# Patient Record
Sex: FEMALE | Race: BLACK OR AFRICAN AMERICAN | Employment: OTHER | URBAN - METROPOLITAN AREA
[De-identification: names, ages, dates, MRNs, and addresses within clinical notes are randomized per-mention and may not be internally consistent; named-entity substitution may affect disease eponyms.]

---

## 2017-01-22 DIAGNOSIS — I10 BENIGN HYPERTENSION: ICD-10-CM

## 2017-01-23 RX ORDER — RAMIPRIL 10 MG/1
CAPSULE ORAL
Qty: 180 CAP | Refills: 1 | Status: SHIPPED | OUTPATIENT
Start: 2017-01-23 | End: 2017-07-18 | Stop reason: SDUPTHER

## 2017-03-27 ENCOUNTER — OFFICE VISIT (OUTPATIENT)
Dept: INTERNAL MEDICINE CLINIC | Age: 62
End: 2017-03-27

## 2017-03-27 VITALS
WEIGHT: 191 LBS | HEIGHT: 60 IN | SYSTOLIC BLOOD PRESSURE: 127 MMHG | BODY MASS INDEX: 37.5 KG/M2 | HEART RATE: 68 BPM | OXYGEN SATURATION: 98 % | TEMPERATURE: 98.5 F | DIASTOLIC BLOOD PRESSURE: 67 MMHG | RESPIRATION RATE: 16 BRPM

## 2017-03-27 DIAGNOSIS — J30.1 SEASONAL ALLERGIC RHINITIS DUE TO POLLEN: Primary | ICD-10-CM

## 2017-03-27 DIAGNOSIS — E11.59 TYPE 2 DIABETES MELLITUS WITH OTHER CIRCULATORY COMPLICATION: ICD-10-CM

## 2017-03-27 RX ORDER — FLUTICASONE PROPIONATE 50 MCG
2 SPRAY, SUSPENSION (ML) NASAL DAILY
Qty: 1 BOTTLE | Refills: 6 | Status: SHIPPED | OUTPATIENT
Start: 2017-03-27 | End: 2017-05-08

## 2017-03-27 NOTE — MR AVS SNAPSHOT
Visit Information Date & Time Provider Department Dept. Phone Encounter #  
 3/27/2017  3:00 PM Andria Kelly MD [facility] [phone] [id number] Your Appointments 7/27/2017  9:00 AM  
ROUTINE CARE with Andria Kelly MD  
[facility]) Appt Note: Follow up visit [place] [address] [place] [id number]  
[address] [address]  
  
    
 10/16/2017  2:20 PM  
ESTABLISHED PATIENT with Anastasia Holliday MD  
[facility] ([facility] SCH[facility]UL[facility]) Appt Note: annual  
 [address] [facility] [place] [address]  
54 [facility] [id number] [place] [address] Upcoming Health Maintenance Date Due DTaP/Tdap/Td series (1 - Tdap) 9/17/1974 PAP AKA CERVICAL CYTOLOGY 9/17/1974 ZOSTER VACCINE AGE 60> 9/17/2013 FOOT EXAM Q1 7/24/2016 INFLUENZA AGE 9 TO ADULT 8/1/2016 COLONOSCOPY 10/26/2016 HEMOGLOBIN A1C Q6M 5/23/2017 MICROALBUMIN Q1 7/25/2017 LIPID PANEL Q1 7/25/2017 BREAST CANCER SCRN MAMMOGRAM 8/26/2017 EYE EXAM RETINAL OR DILATED Q1 10/24/2017 Allergies as of 3/27/2017  Review Complete On: 3/27/2017 By: Svetlana Aleman LPN Severity Noted Reaction Type Reactions Amoxicillin  12/02/2010    Unknown (comments) Current Immunizations  Reviewed on 11/23/2016 Name Date Pneumococcal Polysaccharide (PPSV-23) 10/27/2015 Not reviewed this visit You Were Diagnosed With   
  
 Codes Comments Seasonal allergic rhinitis due to pollen    -  Primary ICD-10-CM: J30.1 ICD-9-CM: 477.0 Type 2 diabetes mellitus with other circulatory complication (HCC)     JKW-35-XS: E11.59 Vitals BP Pulse Temp Resp Height(growth percentile) Weight(growth percentile) 127/67 68 98.5 °F (36.9 °C) (Oral) 16 5' (1.524 m) 191 lb (86.6 kg) SpO2 BMI OB Status Smoking Status 98% 37.3 kg/m2 Postmenopausal Former Smoker Vitals History BMI and BSA Data Body Mass Index Body Surface Area  
 37.3 kg/m 2 1.91 m 2 Preferred Pharmacy Pharmacy Name Phone [id number] [place], [address] [phone] Your Updated Medication List  
  
   
This list is accurate as of: 3/27/17  3:40 PM.  Always use your most recent med list.  
  
  
  
  
 aspirin 325 mg tablet Generic drug:  aspirin Take 325 mg by mouth daily. atorvastatin 40 mg tablet Commonly known as:  LIPITOR Take 1 Tab by mouth every evening. fluticasone 50 mcg/actuation nasal spray Commonly known as:  Alric Eaves 2 Sprays by Both Nostrils route daily. glimepiride 1 mg tablet Commonly known as:  AMARYL  
TAKE 1 TABLET BY MOUTH EVERY DAY BEFORE BREAKFAST  
  
 glucose blood VI test strips strip Commonly known as:  CONTOUR NEXT STRIPS Use one test strip daily  
  
 metFORMIN 1,000 mg tablet Commonly known as:  GLUCOPHAGE  
TAKE 1 TAB BY MOUTH TWO (2) TIMES DAILY (WITH MEALS). NITROLINGUAL 400 mcg/spray spray Generic drug:  nitroglycerin 1-2 Sprays at onset of attack onto or under tongue no more than 3 sprays in 15 minutes  
  
 ramipril 10 mg capsule Commonly known as:  ALTACE  
TAKE 2 CAPSULES BY MOUTH EVERY MORNING  
  
 * TOPROL XL 25 mg XL tablet Generic drug:  metoprolol succinate Take 25 mg by mouth daily. * metoprolol succinate 25 mg XL tablet Commonly known as:  TOPROL-XL  
TAKE 1 TABLET BY MOUTH EVERY DAY  
  
 * Notice: This list has 2 medication(s) that are the same as other medications prescribed for you. Read the directions carefully, and ask your doctor or other care provider to review them with you. Prescriptions Printed Refills fluticasone (FLONASE) 50 mcg/actuation nasal spray 6 Si Sprays by Both Nostrils route daily. Class: Print Route: Both Nostrils We Performed the Following HEMOGLOBIN A1C W/O EAG [91894 CPT(R)] METABOLIC PANEL, COMPREHENSIVE [37005 CPT(R)] Introducing [facility] & [facility]! Jenniflory Stacey introduces [employer] patient portal. Now you can access parts of your medical record, email your doctor's office, and request medication refills online. 1. In your internet browser, go to [email or web address] 2. Click on the First Time User? Click Here link in the Sign In box. You will see the New Member Sign Up page. 3. Enter your [employer] Access Code exactly as it appears below. You will not need to use this code after youve completed the sign-up process. If you do not sign up before the expiration date, you must request a new code. · [employer] Access Code: [email or web address] Expires: 2017  3:31 PM 
 
4. Enter the last four digits of your Social Security Number (xxxx) and Date of Birth (mm/dd/yyyy) as indicated and click Submit. You will be taken to the next sign-up page. 5. Create a [employer] ID. This will be your [employer] login ID and cannot be changed, so think of one that is secure and easy to remember. 6. Create a [employer] password. You can change your password at any time. 7. Enter your Password Reset Question and Answer. This can be used at a later time if you forget your password. 8. Enter your e-mail address. You will receive e-mail notification when new information is available in [address] 9. Click Sign Up. You can now view and download portions of your medical record. 10. Click the Download Summary menu link to download a portable copy of your medical information. If you have questions, please visit the Frequently Asked Questions section of the [employer] website. Remember, [employer] is NOT to be used for urgent needs. For medical emergencies, dial 911. Now available from your iPhone and Android! Please provide this summary of care documentation to your next provider. Your primary care clinician is listed as Marcelo Benavides. If you have any questions after today's visit, please call [phone].

## 2017-03-27 NOTE — PROGRESS NOTES
Chief Complaint   Patient presents with    Follow-up     4 f/u, pt suffering with colds this year      Several months of uris since kim  Has used corricidin      SUBJECTIVE: Llewellyn Brunner is a 59 y.o. female seen for a follow up visit; she has diabetes, hypertension, hyperlipidemia and peripheral vascular disease. Current Outpatient Prescriptions   Medication Sig Dispense Refill    fluticasone (FLONASE) 50 mcg/actuation nasal spray 2 Sprays by Both Nostrils route daily. 1 Bottle 6    metoprolol succinate (TOPROL-XL) 25 mg XL tablet TAKE 1 TABLET BY MOUTH EVERY DAY 90 Tab 3    ramipril (ALTACE) 10 mg capsule TAKE 2 CAPSULES BY MOUTH EVERY MORNING 180 Cap 1    metFORMIN (GLUCOPHAGE) 1,000 mg tablet TAKE 1 TAB BY MOUTH TWO (2) TIMES DAILY (WITH MEALS). 60 Tab 5    glimepiride (AMARYL) 1 mg tablet TAKE 1 TABLET BY MOUTH EVERY DAY BEFORE BREAKFAST 30 Tab 5    atorvastatin (LIPITOR) 40 mg tablet Take 1 Tab by mouth every evening. 90 Tab 1    glucose blood VI test strips (CONTOUR NEXT STRIPS) strip Use one test strip daily 100 Strip 11    metoprolol succinate (TOPROL XL) 25 mg XL tablet Take 25 mg by mouth daily.  aspirin (ASPIRIN) 325 mg tablet Take 325 mg by mouth daily.       nitroglycerin (NITROLINGUAL) 0.4 mg/Dose spray 1-2 Sprays at onset of attack onto or under tongue no more than 3 sprays in 15 minutes        Patient Active Problem List   Diagnosis Code    Coronary artery disease I25.10    Dyslipidemia E78.5    Benign hypertension I10    Peripheral vascular disease (HCC) I73.9    Type 2 diabetes mellitus with circulatory disorder (HCC) E11.59    Subclavian artery disease (HCC) I77.9    Cerebrovascular disease I67.9     System Review: Cardiovascular ROS - taking medications as instructed, no medication side effects noted, no TIA's, no chest pain on exertion, no dyspnea on exertion, no swelling of ankles, no orthopnea or paroxysmal nocturnal dyspnea, calf pain on right after walking a distance, is relieved by resting. New concerns: frequent uri. OBJECTIVE:  Visit Vitals    /67    Pulse 68    Temp 98.5 °F (36.9 °C) (Oral)    Resp 16    Ht 5' (1.524 m)    Wt 191 lb (86.6 kg)    SpO2 98%    BMI 37.3 kg/m2      Appearance: alert, well appearing, and in no distress and oriented to person, place, and time. General exam: CVS exam BP noted to be well controlled today in office, S1, S2 normal, no gallop, no murmur, chest clear, no JVD, no HSM, no edema. Lab review: orders written for new lab studies as appropriate; see orders. ASSESSMENT:  diabetes stable, hypertension stable, hyperlipidemia stable. PLAN:  current treatment plan is effective, no change in therapy. Dona Pichardo was seen today for follow-up. Diagnoses and all orders for this visit:    Seasonal allergic rhinitis due to pollen    Type 2 diabetes mellitus with other circulatory complication ([facility]  -     METABOLIC PANEL, COMPREHENSIVE  -     HEMOGLOBIN A1C W/O EAG    Other orders  -     fluticasone (FLONASE) 50 mcg/actuation nasal spray; 2 Sprays by Both Nostrils route daily.       Try allegfra and flonase

## 2017-03-30 LAB
ALBUMIN SERPL-MCNC: 4 G/DL (ref 3.6–4.8)
ALBUMIN/GLOB SERPL: 1.6 {RATIO} (ref 1.2–2.2)
ALP SERPL-CCNC: 109 IU/L (ref 39–117)
ALT SERPL-CCNC: 10 IU/L (ref 0–32)
AST SERPL-CCNC: 12 IU/L (ref 0–40)
BILIRUB SERPL-MCNC: 0.5 MG/DL (ref 0–1.2)
BUN SERPL-MCNC: 10 MG/DL (ref 8–27)
BUN/CREAT SERPL: 19 (ref 11–26)
CALCIUM SERPL-MCNC: 8.9 MG/DL (ref 8.7–10.3)
CHLORIDE SERPL-SCNC: 98 MMOL/L (ref 96–106)
CO2 SERPL-SCNC: 21 MMOL/L (ref 18–29)
CREAT SERPL-MCNC: 0.53 MG/DL (ref 0.57–1)
GLOBULIN SER CALC-MCNC: 2.5 G/DL (ref 1.5–4.5)
GLUCOSE SERPL-MCNC: 111 MG/DL (ref 65–99)
HBA1C MFR BLD: 6.8 % (ref 4.8–5.6)
POTASSIUM SERPL-SCNC: 4.9 MMOL/L (ref 3.5–5.2)
PROT SERPL-MCNC: 6.5 G/DL (ref 6–8.5)
SODIUM SERPL-SCNC: 140 MMOL/L (ref 134–144)

## 2017-04-08 RX ORDER — ATORVASTATIN CALCIUM 40 MG/1
TABLET, FILM COATED ORAL
Qty: 90 TAB | Refills: 1 | Status: SHIPPED | OUTPATIENT
Start: 2017-04-08 | End: 2017-10-07 | Stop reason: SDUPTHER

## 2017-05-04 ENCOUNTER — OFFICE VISIT (OUTPATIENT)
Dept: INTERNAL MEDICINE CLINIC | Age: 62
End: 2017-05-04

## 2017-05-04 VITALS
BODY MASS INDEX: 37.69 KG/M2 | RESPIRATION RATE: 16 BRPM | SYSTOLIC BLOOD PRESSURE: 160 MMHG | OXYGEN SATURATION: 98 % | DIASTOLIC BLOOD PRESSURE: 75 MMHG | HEART RATE: 103 BPM | HEIGHT: 60 IN | WEIGHT: 192 LBS

## 2017-05-04 DIAGNOSIS — I48.91 ATRIAL FIBRILLATION, UNSPECIFIED TYPE (HCC): ICD-10-CM

## 2017-05-04 DIAGNOSIS — R00.0 TACHYCARDIA: Primary | ICD-10-CM

## 2017-05-04 DIAGNOSIS — R60.0 LOCALIZED EDEMA: ICD-10-CM

## 2017-05-04 RX ORDER — METOPROLOL SUCCINATE 50 MG/1
50 TABLET, EXTENDED RELEASE ORAL DAILY
Qty: 90 TAB | Refills: 1 | Status: SHIPPED | OUTPATIENT
Start: 2017-05-04 | End: 2017-11-03 | Stop reason: SDUPTHER

## 2017-05-04 RX ORDER — WARFARIN SODIUM 5 MG/1
5 TABLET ORAL DAILY
Qty: 30 TAB | Refills: 3 | Status: SHIPPED | OUTPATIENT
Start: 2017-05-04 | End: 2017-05-23 | Stop reason: ALTCHOICE

## 2017-05-04 NOTE — PROGRESS NOTES
Chief Complaint   Patient presents with    Foot Swelling     and ankle swelling for about 1 month      Patient Active Problem List    Diagnosis    Cerebrovascular disease     A. Carotid Duplex (9/7/16): Mod JACKIE, mild LICA, < 14% left SC.  Type 2 diabetes mellitus with circulatory disorder (HCC)    Subclavian artery disease (HCC)    Peripheral vascular disease (HCC)     A. Right SFA stent (11/29/10):  6x150. B. Left LE bypass (11/2010).  Coronary artery disease     A. PCI pLAD (12/14/6):  3.0x18 Cypher. B.  Cath (7/29/8):  LAD d30; D1 ost95 (small). OM1 diffuse 60. RCA p30, d70; Bifurcating PDA ost/prox90. EF 70%. No AVG/MR.  Dyslipidemia     A. FLP (7/24/15): Tot 173, TG 88, HDL 73, LDL 82 (Lipitor 40). B.  FLP (7/25/16): Tot 161, TG 93, HDL 65, LDL 77 (Lipitor 40).  Benign hypertension     Subjective:  She's noticed a development of swelling in both lower extremities for over two weeks. No long car trips or rides on the bus or airplane. She denies PND, orthopnea, shortness of breath or chest pain. She says she's had a couple of episodes of palpitations. The edema gets pretty severe as the day goes on. She's taking her normal medications. She has not had any increase in her claudication and she denies chest pain or chest tightness. Saw cardiology in the fall. Physical Examination:  Her blood pressure was elevated. Her pulse was irregular with a rate of 100. Her chest was clear, there was no JVD. S1, S2 was irregular. There was 1+ edema lower extremities. She says it gets much worse later in the day. I do not see any significant venous insufficiency. Studies:  Her EKG showed atrial fibrillation, moderate response with rates up into the 90s. Impression/Plan:  1. This lady has new onset atrial fibrillation. She takes a full dose aspirin. I recommend she cut the aspirin to 81. I recommended she go on Coumadin 5 mg a day, get a PT-INR in five days. Cardiology consult, Dr. Phylicia Sargent. I told her to call his office and get in to be seen. I doubled her beta blocker to 50 mg a day. I told her to stay off sodium. She will be seen here again in five days to check PT-INR. Discussed some of the new oral anticoagulants, but she wanted to go with a low cost approach. Vitals:    05/04/17 1008 05/04/17 1025   BP: (!) 182/96 160/75   Pulse: (!) 103    Resp: 16    SpO2: 98%    Weight: 192 lb (87.1 kg)    Height: 5' (1.524 m)      Colletta Higashi was seen today for foot swelling. Diagnoses and all orders for this visit:    Tachycardia  -     AMB POC EKG ROUTINE W/ 12 LEADS, INTER & REP  -     MICROALBUMIN, UR, RAND W/ MICROALBUMIN/CREA RATIO  -     BNP  -     TSH 3RD GENERATION    Localized edema  -     AMB POC EKG ROUTINE W/ 12 LEADS, INTER & REP  -     MICROALBUMIN, UR, RAND W/ MICROALBUMIN/CREA RATIO  -     BNP  -     TSH 3RD GENERATION    Atrial fibrillation, unspecified type (HCC)  -     REFERRAL TO CARDIOLOGY    Other orders  -     metoprolol succinate (TOPROL-XL) 50 mg XL tablet; Take 1 Tab by mouth daily. -     warfarin (COUMADIN) 5 mg tablet; Take 1 Tab by mouth daily.

## 2017-05-04 NOTE — MR AVS SNAPSHOT
Visit Information Date & Time Provider Department Dept. Phone Encounter #  
 5/4/2017 10:15 AM Diana Fischer MD [facility] [phone] [id number] Your Appointments 7/27/2017  9:00 AM  
ROUTINE CARE with Diana Fischer MD  
[facility] [address]) Appt Note: Follow up visit [place] [address] [place] [address] [place] [address]  
[address] [place] [address] Anjana Miller [address]  
  
    
 10/16/2017  2:20 PM  
ESTABLISHED PATIENT with Merlin Alba, MD  
[facility] ([facility] SCH[facility]ULING) Appt Note: annual  
 [address] [facility] [address] [address] [facility]  
[address] [place] [address] [address] Upcoming Health Maintenance Date Due DTaP/Tdap/Td series (1 - Tdap) 9/17/1974 PAP AKA CERVICAL CYTOLOGY 9/17/1974 ZOSTER VACCINE AGE 60> 9/17/2013 FOOT EXAM Q1 7/24/2016 COLONOSCOPY 10/26/2016 BREAST CANCER SCRN MAMMOGRAM 8/26/2017 MICROALBUMIN Q1 7/25/2017 LIPID PANEL Q1 7/25/2017 INFLUENZA AGE 9 TO ADULT 8/1/2017 HEMOGLOBIN A1C Q6M 9/29/2017 EYE EXAM RETINAL OR DILATED Q1 10/24/2017 Allergies as of 5/4/2017  Review Complete On: 5/4/2017 By: Gayle Moss LPN Severity Noted Reaction Type Reactions Amoxicillin  12/02/2010    Unknown (comments) Current Immunizations  Reviewed on 11/23/2016 Name Date Pneumococcal Polysaccharide (PPSV-23) 10/27/2015 Not reviewed this visit You Were Diagnosed With   
  
 Codes Comments Tachycardia    -  Primary ICD-10-CM: R00.0 ICD-9-CM: 785.0 Localized edema     ICD-10-CM: R60.0 ICD-9-CM: 829. 3 Atrial fibrillation, unspecified type ([facility]     ICD-10-CM: I48.91 
ICD-9-CM: 427.31 Vitals BP Pulse Resp Height(growth percentile) Weight(growth percentile) SpO2 160/75 (!) 103 16 5' (1.524 m) 192 lb (87.1 kg) 98% BMI OB Status Smoking Status 37.5 kg/m2 Having regular periods Former Smoker Vitals History BMI and BSA Data Body Mass Index Body Surface Area  
 37.5 kg/m 2 1.92 m 2 Preferred Pharmacy Pharmacy Name Phone [id number] [place], [address] [phone] Your Updated Medication List  
  
   
This list is accurate as of: 5/4/17 11:02 AM.  Always use your most recent med list.  
  
  
  
  
 aspirin 325 mg tablet Generic drug:  aspirin Take 325 mg by mouth daily. atorvastatin 40 mg tablet Commonly known as:  LIPITOR  
TAKE 1 TAB BY MOUTH EVERY EVENING. fluticasone 50 mcg/actuation nasal spray Commonly known as:  Jose Alfredo Bumpers 2 Sprays by Both Nostrils route daily. glimepiride 1 mg tablet Commonly known as:  AMARYL  
TAKE 1 TABLET BY MOUTH EVERY DAY BEFORE BREAKFAST  
  
 glucose blood VI test strips strip Commonly known as:  CONTOUR NEXT STRIPS Use one test strip daily  
  
 metFORMIN 1,000 mg tablet Commonly known as:  GLUCOPHAGE  
TAKE 1 TAB BY MOUTH TWO (2) TIMES DAILY (WITH MEALS). NITROLINGUAL 400 mcg/spray spray Generic drug:  nitroglycerin 1-2 Sprays at onset of attack onto or under tongue no more than 3 sprays in 15 minutes  
  
 ramipril 10 mg capsule Commonly known as:  ALTACE  
TAKE 2 CAPSULES BY MOUTH EVERY MORNING  
  
 * TOPROL XL 25 mg XL tablet Generic drug:  metoprolol succinate Take 25 mg by mouth daily. * metoprolol succinate 50 mg XL tablet Commonly known as:  TOPROL-XL Take 1 Tab by mouth daily. warfarin 5 mg tablet Commonly known as:  COUMADIN Take 1 Tab by mouth daily. * Notice: This list has 2 medication(s) that are the same as other medications prescribed for you.  Read the directions carefully, and ask your doctor or other care provider to review them with you. Prescriptions Printed Refills  
 metoprolol succinate (TOPROL-XL) 50 mg XL tablet 1 Sig: Take 1 Tab by mouth daily. Class: Print Route: Oral  
  
Prescriptions Sent to Pharmacy Refills  
 warfarin (COUMADIN) 5 mg tablet 3 Sig: Take 1 Tab by mouth daily. Class: Normal  
 Pharmacy: [address] [place] [address], [address]  #: [phone] Route: Oral  
  
We Performed the Following AMB POC EKG ROUTINE W/ 12 LEADS, INTER & REP [20396 CPT(R)] BNP [id number] CPT(R)] MICROALBUMIN, UR, RAND W/ MICROALBUMIN/CREA RATIO [id number] CPT(R)] REFERRAL TO CARDIOLOGY [VDH40 Custom] Comments:  
 Please evaluate patient for a fib [place] TSH 3RD GENERATION [33635 CPT(R)] Referral Information Referral ID Referred By Referred To  
  
 [id number] Kelly Kay MD   
   [address] [place] [address] [phone] [address] Phone: [phone] Visits Status Start Date End Date 1 New Request 5/4/17 5/4/18 If your referral has a status of pending review or denied, additional information will be sent to support the outcome of this decision. Introducing [facility]! [employer] introduces [employer] patient portal. Now you can access parts of your medical record, email your doctor's office, and request medication refills online. 1. In your internet browser, go to [email or web address] 2. Click on the First Time User? Click Here link in the Sign In box. You will see the New Member Sign Up page. 3. Enter your [employer] Access Code exactly as it appears below. You will not need to use this code after youve completed the sign-up process. If you do not sign up before the expiration date, you must request a new code. · [employer] Access Code: [email or web address] Expires: 6/25/2017  3:31 PM 
 
 4. Enter the last four digits of your Social Security Number (xxxx) and Date of Birth (mm/dd/yyyy) as indicated and click Submit. You will be taken to the next sign-up page. 5. Create a [employer] ID. This will be your [employer] login ID and cannot be changed, so think of one that is secure and easy to remember. 6. Create a [employer] password. You can change your password at any time. 7. Enter your Password Reset Question and Answer. This can be used at a later time if you forget your password. 8. Enter your e-mail address. You will receive e-mail notification when new information is available in [address] 9. Click Sign Up. You can now view and download portions of your medical record. 10. Click the Download Summary menu link to download a portable copy of your medical information. If you have questions, please visit the Frequently Asked Questions section of the [employer] website. Remember, [employer] is NOT to be used for urgent needs. For medical emergencies, dial 911. Now available from your iPhone and Android! Please provide this summary of care documentation to your next provider. Your primary care clinician is listed as Sonja Tuttle. If you have any questions after today's visit, please call [phone].

## 2017-05-05 LAB
ALBUMIN/CREAT UR: <13.6 MG/G CREAT (ref 0–30)
BNP SERPL-MCNC: 176.9 PG/ML (ref 0–100)
CREAT UR-MCNC: 22 MG/DL
MICROALBUMIN UR-MCNC: <3 UG/ML
TSH SERPL DL<=0.005 MIU/L-ACNC: 1.46 UIU/ML (ref 0.45–4.5)

## 2017-05-08 ENCOUNTER — OFFICE VISIT (OUTPATIENT)
Dept: CARDIOLOGY CLINIC | Age: 62
End: 2017-05-08

## 2017-05-08 VITALS
SYSTOLIC BLOOD PRESSURE: 114 MMHG | WEIGHT: 193.6 LBS | RESPIRATION RATE: 18 BRPM | BODY MASS INDEX: 38.01 KG/M2 | HEART RATE: 60 BPM | OXYGEN SATURATION: 98 % | DIASTOLIC BLOOD PRESSURE: 62 MMHG | HEIGHT: 60 IN

## 2017-05-08 DIAGNOSIS — I67.9 CEREBROVASCULAR DISEASE: ICD-10-CM

## 2017-05-08 DIAGNOSIS — I10 BENIGN HYPERTENSION: ICD-10-CM

## 2017-05-08 DIAGNOSIS — I25.10 CORONARY ARTERY DISEASE INVOLVING NATIVE CORONARY ARTERY OF NATIVE HEART WITHOUT ANGINA PECTORIS: ICD-10-CM

## 2017-05-08 DIAGNOSIS — I48.91 ATRIAL FIBRILLATION, UNSPECIFIED TYPE (HCC): Primary | ICD-10-CM

## 2017-05-08 DIAGNOSIS — E11.59 TYPE 2 DIABETES MELLITUS WITH OTHER CIRCULATORY COMPLICATION: ICD-10-CM

## 2017-05-08 DIAGNOSIS — I73.9 PERIPHERAL VASCULAR DISEASE (HCC): ICD-10-CM

## 2017-05-08 DIAGNOSIS — E78.5 DYSLIPIDEMIA: ICD-10-CM

## 2017-05-08 DIAGNOSIS — I73.9 SUBCLAVIAN ARTERY DISEASE (HCC): ICD-10-CM

## 2017-05-08 RX ORDER — ASPIRIN 81 MG/1
TABLET ORAL DAILY
COMMUNITY

## 2017-05-08 NOTE — PATIENT INSTRUCTIONS
Pocket Concierge Activation    Thank you for requesting access to Pocket Concierge. Please follow the instructions below to securely access and download your online medical record. Pocket Concierge allows you to send messages to your doctor, view your test results, renew your prescriptions, schedule appointments, and more. How Do I Sign Up? 1. In your internet browser, go to [email or web address]  2. Click on the First Time User? Click Here link in the Sign In box. You will be redirect to the New Member Sign Up page. 3. Enter your Pocket Concierge Access Code exactly as it appears below. You will not need to use this code after youve completed the sign-up process. If you do not sign up before the expiration date, you must request a new code. Pocket Concierge Access Code: [email or web address]  Expires: 2017  3:31 PM (This is the date your Pocket Concierge access code will )    4. Enter the last four digits of your Social Security Number (xxxx) and Date of Birth (mm/dd/yyyy) as indicated and click Submit. You will be taken to the next sign-up page. 5. Create a Pocket Concierge ID. This will be your Pocket Concierge login ID and cannot be changed, so think of one that is secure and easy to remember. 6. Create a Pocket Concierge password. You can change your password at any time. 7. Enter your Password Reset Question and Answer. This can be used at a later time if you forget your password. 8. Enter your e-mail address. You will receive e-mail notification when new information is available in [address]. 9. Click Sign Up. You can now view and download portions of your medical record. 10. Click the Download Summary menu link to download a portable copy of your medical information. Additional Information    If you have questions, please visit the Frequently Asked Questions section of the Pocket Concierge website at [email or web address] Remember, Pocket Concierge is NOT to be used for urgent needs. For medical emergencies, dial 911.            Atrial Fibrillation: Care Instructions  Your Care Instructions    Atrial fibrillation is an irregular and often fast heartbeat. Treating this condition is important for several reasons. It can cause blood clots, which can travel from your heart to your brain and cause a stroke. If you have a fast heartbeat, you may feel lightheaded, dizzy, and weak. An irregular heartbeat can also increase your risk for heart failure. Atrial fibrillation is often the result of another heart condition, such as high blood pressure or coronary artery disease. Making changes to improve your heart condition will help you stay healthy and active. Follow-up care is a key part of your treatment and safety. Be sure to make and go to all appointments, and call your doctor if you are having problems. It's also a good idea to know your test results and keep a list of the medicines you take. How can you care for yourself at home? Medicines  · Take your medicines exactly as prescribed. Call your doctor if you think you are having a problem with your medicine. You will get more details on the specific medicines your doctor prescribes. · If your doctor has given you a blood thinner to prevent a stroke, be sure you get instructions about how to take your medicine safely. Blood thinners can cause serious bleeding problems. · Do not take any vitamins, over-the-counter drugs, or herbal products without talking to your doctor first.  Lifestyle changes  · Do not smoke. Smoking can increase your chance of a stroke and heart attack. If you need help quitting, talk to your doctor about stop-smoking programs and medicines. These can increase your chances of quitting for good. · Eat a heart-healthy diet. · Stay at a healthy weight. Lose weight if you need to. · Limit alcohol to 2 drinks a day for men and 1 drink a day for women. Too much alcohol can cause health problems. · Avoid colds and flu. Get a pneumococcal vaccine shot.  If you have had one before, ask your doctor whether you need another dose. Get a flu shot every year. If you must be around people with colds or flu, wash your hands often. Activity  · If your doctor recommends it, get more exercise. Walking is a good choice. Bit by bit, increase the amount you walk every day. Try for at least 30 minutes on most days of the week. You also may want to swim, bike, or do other activities. Your doctor may suggest that you join a cardiac rehabilitation program so that you can have help increasing your physical activity safely. · Start light exercise if your doctor says it is okay. Even a small amount will help you get stronger, have more energy, and manage stress. Walking is an easy way to get exercise. Start out by walking a little more than you did in the hospital. Gradually increase the amount you walk. · When you exercise, watch for signs that your heart is working too hard. You are pushing too hard if you cannot talk while you are exercising. If you become short of breath or dizzy or have chest pain, sit down and rest immediately. · Check your pulse regularly. Place two fingers on the artery at the palm side of your wrist, in line with your thumb. If your heartbeat seems uneven or fast, talk to your doctor. When should you call for help? Call 911 anytime you think you may need emergency care. For example, call if:  · You have symptoms of a heart attack. These may include:  ¨ Chest pain or pressure, or a strange feeling in the chest.  ¨ Sweating. ¨ Shortness of breath. ¨ Nausea or vomiting. ¨ Pain, pressure, or a strange feeling in the back, neck, jaw, or upper belly or in one or both shoulders or arms. ¨ Lightheadedness or sudden weakness. ¨ A fast or irregular heartbeat. After you call 911, the  may tell you to chew 1 adult-strength or 2 to 4 low-dose aspirin. Wait for an ambulance. Do not try to drive yourself. · You have symptoms of a stroke.  These may include:  ¨ Sudden numbness, tingling, weakness, or loss of movement in your face, arm, or leg, especially on only one side of your body. ¨ Sudden vision changes. ¨ Sudden trouble speaking. ¨ Sudden confusion or trouble understanding simple statements. ¨ Sudden problems with walking or balance. ¨ A sudden, severe headache that is different from past headaches. · You passed out (lost consciousness). Call your doctor now or seek immediate medical care if:  · You have new or increased shortness of breath. · You feel dizzy or lightheaded, or you feel like you may faint. · Your heart rate becomes irregular. · You can feel your heart flutter in your chest or skip heartbeats. Tell your doctor if these symptoms are new or worse. Watch closely for changes in your health, and be sure to contact your doctor if you have any problems. Where can you learn more? Go to http://abril-herlinda.info/. Enter U020 in the search box to learn more about \"Atrial Fibrillation: Care Instructions. \"  Current as of: January 27, 2016  Content Version: 11.2  © 0803-8158 [employer]. Care instructions adapted under license by [employer] (which disclaims liability or warranty for this information). If you have questions about a medical condition or this instruction, always ask your healthcare professional. [place] [address] any warranty or liability for your use of this information.

## 2017-05-08 NOTE — MR AVS SNAPSHOT
Visit Information Date & Time Provider Department Dept. Phone Encounter #  
 5/8/2017  2:20 PM Alejandra Reece MD [facility] [phone] [id number] Your Appointments 5/9/2017 10:15 AM  
ROUTINE CARE with Stephani Gunderson MD  
[facility] Appt Note: f/u  
 [place] [address] [facility] [address]  
[address] [place] [address] Drive  
  
    
 7/27/2017  9:00 AM  
ROUTINE CARE with Stephani Gunderson MD  
[facility] Appt Note: Follow up visit [place] [address]  
[phone]  
  
    
 10/16/2017  2:20 PM  
ESTABLISHED PATIENT with Alejandra Reece MD  
[facility] (KAIT SCHEDULING) Appt Note: annual  
 [address] [place] [address] [facility]  
[address] [facility] [address] [address] Upcoming Health Maintenance Date Due DTaP/Tdap/Td series (1 - Tdap) 9/17/1974 PAP AKA CERVICAL CYTOLOGY 9/17/1974 ZOSTER VACCINE AGE 60> 9/17/2013 FOOT EXAM Q1 7/24/2016 COLONOSCOPY 10/26/2016 BREAST CANCER SCRN MAMMOGRAM 8/26/2017 LIPID PANEL Q1 7/25/2017 INFLUENZA AGE 9 TO ADULT 8/1/2017 HEMOGLOBIN A1C Q6M 9/29/2017 EYE EXAM RETINAL OR DILATED Q1 10/24/2017 MICROALBUMIN Q1 5/4/2018 Allergies as of 5/8/2017  Review Complete On: 5/8/2017 By: Alejandra Reece MD  
  
 Severity Noted Reaction Type Reactions Amoxicillin  12/02/2010    Unknown (comments) Current Immunizations  Reviewed on 11/23/2016 Name Date Pneumococcal Polysaccharide (PPSV-23) 10/27/2015 Not reviewed this visit Vitals BP Pulse Resp Height(growth percentile) Weight(growth percentile) SpO2  
 114/62 (BP 1 Location: Right arm) 60 18 5' (1.524 m) 193 lb 9.6 oz (87.8 kg) 98% BMI OB Status Smoking Status 37.81 kg/m2 Having regular periods Former Smoker Vitals History BMI and BSA Data Body Mass Index Body Surface Area  
 37.81 kg/m 2 1.93 m 2 Preferred Pharmacy Pharmacy Name Phone [id number] [place], [address] [phone] Your Updated Medication List  
  
   
This list is accurate as of: 5/8/17  2:23 PM.  Always use your most recent med list.  
  
  
  
  
 aspirin delayed-release 81 mg tablet Take  by mouth daily. atorvastatin 40 mg tablet Commonly known as:  LIPITOR  
TAKE 1 TAB BY MOUTH EVERY EVENING. glimepiride 1 mg tablet Commonly known as:  AMARYL  
TAKE 1 TABLET BY MOUTH EVERY DAY BEFORE BREAKFAST  
  
 glucose blood VI test strips strip Commonly known as:  CONTOUR NEXT STRIPS Use one test strip daily  
  
 metFORMIN 1,000 mg tablet Commonly known as:  GLUCOPHAGE  
TAKE 1 TAB BY MOUTH TWO (2) TIMES DAILY (WITH MEALS). metoprolol succinate 50 mg XL tablet Commonly known as:  TOPROL-XL Take 1 Tab by mouth daily. NITROLINGUAL 400 mcg/spray spray Generic drug:  nitroglycerin 1-2 Sprays at onset of attack onto or under tongue no more than 3 sprays in 15 minutes  
  
 ramipril 10 mg capsule Commonly known as:  ALTACE  
TAKE 2 CAPSULES BY MOUTH EVERY MORNING  
  
 warfarin 5 mg tablet Commonly known as:  COUMADIN Take 1 Tab by mouth daily. Introducing [facility]! Teo Khan introduces Bufys patient portal. Now you can access parts of your medical record, email your doctor's office, and request medication refills online. 1. In your internet browser, go to [email or web address] 2. Click on the First Time User? Click Here link in the Sign In box. You will see the New Member Sign Up page. 3. Enter your Bufys Access Code exactly as it appears below.  You will not need to use this code after youve completed the sign-up process. If you do not sign up before the expiration date, you must request a new code. · Cylex Access Code: [email or web address] Expires: 6/25/2017  3:31 PM 
 
4. Enter the last four digits of your Social Security Number (xxxx) and Date of Birth (mm/dd/yyyy) as indicated and click Submit. You will be taken to the next sign-up page. 5. Create a Cylex ID. This will be your Cylex login ID and cannot be changed, so think of one that is secure and easy to remember. 6. Create a Cylex password. You can change your password at any time. 7. Enter your Password Reset Question and Answer. This can be used at a later time if you forget your password. 8. Enter your e-mail address. You will receive e-mail notification when new information is available in [address]. 9. Click Sign Up. You can now view and download portions of your medical record. 10. Click the Download Summary menu link to download a portable copy of your medical information. If you have questions, please visit the Frequently Asked Questions section of the Cylex website. Remember, Cylex is NOT to be used for urgent needs. For medical emergencies, dial 911. Now available from your iPhone and Android! Please provide this summary of care documentation to your next provider. Your primary care clinician is listed as Tee Valenzuela. If you have any questions after today's visit, please call [phone].

## 2017-05-08 NOTE — PROGRESS NOTES
Subjective:     Problem List  Date Reviewed: 5/8/2017          Codes Class Noted    Atrial fibrillation [facility]) ICD-10-CM: I48.91  ICD-9-CM: 427.31  5/8/2017        Cerebrovascular disease ICD-10-CM: I67.9  ICD-9-CM: 437.9  10/14/2016    Overview Signed 10/14/2016  1:25 PM by MD TYLER Larsen. Carotid Duplex (9/7/16): Mod JACKIE, mild LICA, < 63% left SC. Type 2 diabetes mellitus with circulatory disorder (HCC) ICD-10-CM: E11.59  ICD-9-CM: 250.70  8/4/2016        Subclavian artery disease ([facility] [address] ICD-10-CM: I77.9  ICD-9-CM: 447.9  8/4/2016        Peripheral vascular disease ([facility] ICD-10-CM: I73.9  ICD-9-CM: 443.9  9/11/2012    Overview Signed 9/11/2012  3:43 PM by MD TYLER Larsen. Right SFA stent (11/29/10):  6x150. B. Left LE bypass (11/2010). Coronary artery disease ICD-10-CM: I25.10  ICD-9-CM: 414.00  Unknown    Overview Signed 9/11/2012  3:42 PM by MD TYLER Larsen.  PCI pLAD (12/14/6):  3.0x18 Cypher. B.  Cath (7/29/8):  LAD d30; D1 ost95 (small). OM1 diffuse 60. RCA p30, d70; Bifurcating PDA ost/prox90. EF 70%. No AVG/MR. Dyslipidemia ICD-10-CM: E78.5  ICD-9-CM: 272.4  Unknown    Overview Addendum 10/14/2016  1:01 PM by MD TYLER Larsen. FLP (7/24/15): Tot 173, TG 88, HDL 73, LDL 82 (Lipitor 40). B.  FLP (7/25/16): Tot 161, TG 93, HDL 65, LDL 77 (Lipitor 40). Benign hypertension ICD-10-CM: I10  ICD-9-CM: 401.1  Unknown              Ms. Jamison Morales is a 59 y.o. woman with the above past medical history, who presents for further evaluation regarding atrial fibrillation. She saw Dr. Verna Najjar recently and she was complaining of lower extremity swelling. He noted that she was in atrial fibrillation and her heart rate was a little bit elevated at that time, as was her blood pressure. He increased her Toprol and now she seems better rate controlled, and she reports that her lower extremity edema is markedly improved.   She denies any chest pain, chest discomfort, shortness of breath, dyspnea on exertion, orthopnea, paroxysmal nocturnal dyspnea, palpitations, syncope or near syncope. History   Smoking Status    Former Smoker   Smokeless Tobacco    Not on file       Current Outpatient Prescriptions   Medication Sig Dispense Refill    aspirin delayed-release 81 mg tablet Take  by mouth daily.  metoprolol succinate (TOPROL-XL) 50 mg XL tablet Take 1 Tab by mouth daily. 90 Tab 1    warfarin (COUMADIN) 5 mg tablet Take 1 Tab by mouth daily. 30 Tab 3    atorvastatin (LIPITOR) 40 mg tablet TAKE 1 TAB BY MOUTH EVERY EVENING. 90 Tab 1    ramipril (ALTACE) 10 mg capsule TAKE 2 CAPSULES BY MOUTH EVERY MORNING 180 Cap 1    metFORMIN (GLUCOPHAGE) 1,000 mg tablet TAKE 1 TAB BY MOUTH TWO (2) TIMES DAILY (WITH MEALS). 60 Tab 5    glimepiride (AMARYL) 1 mg tablet TAKE 1 TABLET BY MOUTH EVERY DAY BEFORE BREAKFAST 30 Tab 5    glucose blood VI test strips (CONTOUR NEXT STRIPS) strip Use one test strip daily 100 Strip 11    nitroglycerin (NITROLINGUAL) 0.4 mg/Dose spray 1-2 Sprays at onset of attack onto or under tongue no more than 3 sprays in 15 minutes          Objective:     Visit Vitals    /62 (BP 1 Location: Right arm)    Pulse 60    Resp 18    Ht 5' (1.524 m)    Wt 193 lb 9.6 oz (87.8 kg)    SpO2 98%    BMI 37.81 kg/m2       HEENT Exam:     Normocephalic, atraumatic. EOMI. Oropharynx negative. Neck supple. No lymphadenopathy. Lung Exam:     The patient is not dyspneic. There is no cough. The lungs are clear to percussion. Breath sounds are heard equally in all lung fields. There are no wheezes, rales, rhonchi, or rubs heard on auscultation. Heart Exam:     The rhythm is irregularly irregular. The PMI is in the 5th intercostal space of the MCL. Apical impulse is normal. S1 is regular. S2 is physiologic. There is no S3, S4 gallop, murmur, click, or rub.           Abdomen Exam:     Bowel sounds are normoactive. Abdomen benign. Extremities Exam:     The extremities are atraumatic appearing. There is no clubbing, cyanosis, edema, ulcers, varicose veins, rash, swelling, erythemia noted in the extremities. The neurovascular status is grossly intact with normal distal sensation and pulses. Vascular Exam:     The radial, brachial, dorsalis pedis, posterior tibial, are equal and strong bilaterally The carotids are equal bilaterally without bruits. EKG: AF @ 94, NSSTTW abn. Assessment/Plan:     Ms. Venessa Hill appears stable from a cardiac standpoint. She is all ready being anticoagulated with Coumadin, and her heart rate is under control on increased dose of Metoprolol. We had a lengthy discussion with regard to the newer anticoagulants. She is going to see Dr. Cruz Vasquez tomorrow to discuss this with him as well. If her insurance will cover Eliquis nicely then this may be a good alternative for her. We are going to obtain a transthoracic echocardiogram in the near future to assess for structural heart disease, as well as left atrial size. She is going to follow up with me in one month's time. We discussed diet and exercise. We also reviewed all the management strategies of atrial fibrillation including rate control with anticoagulation versus rhythm control versus antiarrhythmic therapy versus ablation. Plan:  1. Continue outpatient medication regimen. 2. Transthoracic echocardiogram in the near future. 3. Follow up with me in one month's time. 4. Diet and exercise. 5. Call my office, call her primary care physician, or return to the hospital should any concerning symptomatology arise. Ms. Venessa Hill indicated that she understood this plan and wished to proceed ahead.              Patient Care Team:  Travon Jenkins MD as PCP - General (Internal Medicine)  Paris Appiah MD (Vascular Surgery)  Ramone Dunn MD as Physician (Cardiology)

## 2017-05-09 ENCOUNTER — OFFICE VISIT (OUTPATIENT)
Dept: INTERNAL MEDICINE CLINIC | Age: 62
End: 2017-05-09

## 2017-05-09 VITALS
SYSTOLIC BLOOD PRESSURE: 137 MMHG | DIASTOLIC BLOOD PRESSURE: 77 MMHG | HEART RATE: 90 BPM | WEIGHT: 192 LBS | RESPIRATION RATE: 14 BRPM | OXYGEN SATURATION: 99 % | BODY MASS INDEX: 37.69 KG/M2 | HEIGHT: 60 IN | TEMPERATURE: 98.5 F

## 2017-05-09 DIAGNOSIS — I48.91 ATRIAL FIBRILLATION, UNSPECIFIED TYPE (HCC): Primary | ICD-10-CM

## 2017-05-09 LAB
INR BLD: 1.2
PT POC: 14.7 SEC
VALID INTERNAL CONTROL?: YES

## 2017-05-09 NOTE — PROGRESS NOTES
Results for orders placed or performed in visit on 05/09/17   AMB POC PT/INR   Result Value Ref Range    VALID INTERNAL CONTROL POC Yes     Prothrombin time (POC) 14.7 sec    INR POC 1.2      The patient is taking warfarin 5 mg daily.

## 2017-05-09 NOTE — MR AVS SNAPSHOT
Visit Information Date & Time Provider Department Dept. Phone Encounter #  
 5/9/2017 10:15 AM Santo Vinson MD [facility] [phone] [id number] Your Appointments 5/18/2017  9:00 AM  
ECHO CARDIOGRAMS 2D with JAIR KERR  
[facility] ([facility] SCHEDULING) Appt Note: echo per dr x  
 [address] [facility] [address]  
[phone]  
  
   
 [address] [facility] [address]  
  
    
 6/6/2017  2:00 PM  
ESTABLISHED PATIENT with Nellie Garcia MD  
[facility] ([facility]) Appt Note: 1 mo fup  
 [address] [facility] [address] [facility]  
[phone]  
  
   
 [address] [place] [address]  
  
    
 7/27/2017  9:00 AM  
ROUTINE CARE with Santo Vinson MD  
[facility]) Appt Note: Follow up visit [facility] [address] [facility] [address]  
[address] [facility]  [address]  
  
    
 10/16/2017  2:20 PM  
ESTABLISHED PATIENT with Nellie Garcia MD  
[facility] ([address] SCHEDULING) Appt Note: annual  
 [address] [facility] [address] [facility]  
[phone] Upcoming Health Maintenance Date Due DTaP/Tdap/Td series (1 - Tdap) 9/17/1974 PAP AKA CERVICAL CYTOLOGY 9/17/1974 ZOSTER VACCINE AGE 60> 9/17/2013 FOOT EXAM Q1 7/24/2016 COLONOSCOPY 10/26/2016 BREAST CANCER SCRN MAMMOGRAM 8/26/2017 LIPID PANEL Q1 7/25/2017 INFLUENZA AGE 9 TO ADULT 8/1/2017 HEMOGLOBIN A1C Q6M 9/29/2017 EYE EXAM RETINAL OR DILATED Q1 10/24/2017 MICROALBUMIN Q1 5/4/2018 Allergies as of 5/9/2017  Review Complete On: 5/9/2017 By: Jo Prince LPN Severity Noted Reaction Type Reactions Amoxicillin  12/02/2010    Unknown (comments) Current Immunizations  Reviewed on 11/23/2016 Name Date Pneumococcal Polysaccharide (PPSV-23) 10/27/2015 Not reviewed this visit You Were Diagnosed With   
  
 Codes Comments Atrial fibrillation, unspecified type ([facility]    -  Primary ICD-10-CM: I48.91 
ICD-9-CM: 427.31 Vitals BP Pulse Temp Resp Height(growth percentile) Weight(growth percentile)  
 137/77 (BP 1 Location: Left arm, BP Patient Position: Sitting) 90 98.5 °F (36.9 °C) (Oral) 14 5' (1.524 m) 192 lb (87.1 kg) SpO2 BMI OB Status Smoking Status 99% 37.5 kg/m2 Having regular periods Former Smoker Vitals History BMI and BSA Data Body Mass Index Body Surface Area  
 37.5 kg/m 2 1.92 m 2 Preferred Pharmacy Pharmacy Name Phone [id number] [place], [address] [phone] Your Updated Medication List  
  
   
This list is accurate as of: 5/9/17 11:03 AM.  Always use your most recent med list.  
  
  
  
  
 aspirin delayed-release 81 mg tablet Take  by mouth daily. atorvastatin 40 mg tablet Commonly known as:  LIPITOR  
TAKE 1 TAB BY MOUTH EVERY EVENING. glimepiride 1 mg tablet Commonly known as:  AMARYL  
TAKE 1 TABLET BY MOUTH EVERY DAY BEFORE BREAKFAST  
  
 glucose blood VI test strips strip Commonly known as:  CONTOUR NEXT STRIPS Use one test strip daily  
  
 metFORMIN 1,000 mg tablet Commonly known as:  GLUCOPHAGE  
TAKE 1 TAB BY MOUTH TWO (2) TIMES DAILY (WITH MEALS). metoprolol succinate 50 mg XL tablet Commonly known as:  TOPROL-XL Take 1 Tab by mouth daily. NITROLINGUAL 400 mcg/spray spray Generic drug:  nitroglycerin 1-2 Sprays at onset of attack onto or under tongue no more than 3 sprays in 15 minutes  
  
 ramipril 10 mg capsule Commonly known as:  ALTACE  
TAKE 2 CAPSULES BY MOUTH EVERY MORNING  
  
 warfarin 5 mg tablet Commonly known as:  COUMADIN  
 Take 1 Tab by mouth daily. We Performed the Following AMB POC PT/INR [97543 CPT(R)] Introducing [facility]! Aleksey Dubon introduces Mocana patient portal. Now you can access parts of your medical record, email your doctor's office, and request medication refills online. 1. In your internet browser, go to [email or web address] 2. Click on the First Time User? Click Here link in the Sign In box. You will see the New Member Sign Up page. 3. Enter your Mocana Access Code exactly as it appears below. You will not need to use this code after youve completed the sign-up process. If you do not sign up before the expiration date, you must request a new code. · Mocana Access Code: [email or web address] Expires: 6/25/2017  3:31 PM 
 
4. Enter the last four digits of your Social Security Number (xxxx) and Date of Birth (mm/dd/yyyy) as indicated and click Submit. You will be taken to the next sign-up page. 5. Create a Mocana ID. This will be your Mocana login ID and cannot be changed, so think of one that is secure and easy to remember. 6. Create a Mocana password. You can change your password at any time. 7. Enter your Password Reset Question and Answer. This can be used at a later time if you forget your password. 8. Enter your e-mail address. You will receive e-mail notification when new information is available in [address] 9. Click Sign Up. You can now view and download portions of your medical record. 10. Click the Download Summary menu link to download a portable copy of your medical information. If you have questions, please visit the Frequently Asked Questions section of the Mocana website. Remember, Mocana is NOT to be used for urgent needs. For medical emergencies, dial 911. Now available from your iPhone and Android! Please provide this summary of care documentation to your next provider. Your primary care clinician is listed as Elidia Strickland. If you have any questions after today's visit, please call [phone].

## 2017-05-09 NOTE — PROGRESS NOTES
Chief Complaint   Patient presents with    Anticoagulation    Foot Swelling     Tasha Barba is a 59 y.o. female who presents today for Anticoagulation monitoring. Indication: Atrial Fibrillation  INR Goal: 2.0-3.0. Current dose:  Coumadin 5mg daily. Missed Coumadin Doses:  None  Medication Changes:  no  Dietary Changes:  no    Symptoms: taking coumadin appropriately without any bleeding. Latest INRs:  Lab Results   Component Value Date/Time    INR POC 1.2 05/09/2017 10:45 AM        New Coumadin dose:.the following changes are made - 7.5 mg. Next check to be scheduled for  1 weeks.   Vitals:    05/09/17 1028   BP: 137/77   Pulse: 90   Resp: 14   Temp: 98.5 °F (36.9 °C)   TempSrc: Oral   SpO2: 99%   Weight: 192 lb (87.1 kg)   Height: 5' (1.524 m)       irreg irreg rhythm controlled rate    Saw card yesterday  Echo next week  Edema gone

## 2017-05-10 DIAGNOSIS — E11.59 OBESITY, DIABETES, AND HYPERTENSION SYNDROME (HCC): ICD-10-CM

## 2017-05-10 DIAGNOSIS — I15.2 OBESITY, DIABETES, AND HYPERTENSION SYNDROME (HCC): ICD-10-CM

## 2017-05-10 DIAGNOSIS — E66.9 OBESITY, DIABETES, AND HYPERTENSION SYNDROME (HCC): ICD-10-CM

## 2017-05-10 DIAGNOSIS — E11.69 OBESITY, DIABETES, AND HYPERTENSION SYNDROME (HCC): ICD-10-CM

## 2017-05-10 RX ORDER — GLIMEPIRIDE 1 MG/1
TABLET ORAL
Qty: 30 TAB | Refills: 5 | Status: SHIPPED | OUTPATIENT
Start: 2017-05-10 | End: 2017-05-11 | Stop reason: SDUPTHER

## 2017-05-11 DIAGNOSIS — E11.59 OBESITY, DIABETES, AND HYPERTENSION SYNDROME (HCC): ICD-10-CM

## 2017-05-11 DIAGNOSIS — I15.2 OBESITY, DIABETES, AND HYPERTENSION SYNDROME (HCC): ICD-10-CM

## 2017-05-11 DIAGNOSIS — E66.9 OBESITY, DIABETES, AND HYPERTENSION SYNDROME (HCC): ICD-10-CM

## 2017-05-11 DIAGNOSIS — E11.69 OBESITY, DIABETES, AND HYPERTENSION SYNDROME (HCC): ICD-10-CM

## 2017-05-11 RX ORDER — GLIMEPIRIDE 1 MG/1
TABLET ORAL
Qty: 30 TAB | Refills: 5 | Status: SHIPPED | OUTPATIENT
Start: 2017-05-11

## 2017-05-16 ENCOUNTER — OFFICE VISIT (OUTPATIENT)
Dept: INTERNAL MEDICINE CLINIC | Age: 62
End: 2017-05-16

## 2017-05-16 VITALS
HEART RATE: 64 BPM | BODY MASS INDEX: 37.11 KG/M2 | HEIGHT: 60 IN | TEMPERATURE: 98.3 F | RESPIRATION RATE: 20 BRPM | DIASTOLIC BLOOD PRESSURE: 69 MMHG | WEIGHT: 189 LBS | OXYGEN SATURATION: 97 % | SYSTOLIC BLOOD PRESSURE: 142 MMHG

## 2017-05-16 DIAGNOSIS — Z51.81 ENCOUNTER FOR MONITORING COUMADIN THERAPY: Primary | ICD-10-CM

## 2017-05-16 DIAGNOSIS — I48.91 ATRIAL FIBRILLATION, UNSPECIFIED TYPE (HCC): ICD-10-CM

## 2017-05-16 DIAGNOSIS — Z79.01 ENCOUNTER FOR MONITORING COUMADIN THERAPY: Primary | ICD-10-CM

## 2017-05-16 LAB
INR BLD: 2.7
PT POC: 31.8 SEC
VALID INTERNAL CONTROL?: YES

## 2017-05-16 NOTE — MR AVS SNAPSHOT
Visit Information Date & Time Provider Department Dept. Phone Encounter #  
 5/16/2017 10:10 AM Rosa Nichole PA-C [facility] [phone] [id number] Your Appointments 5/16/2017 11:10 AM  
ACUTE CARE with Aislinn Vickers PA-C [facility] ([facility] Appt Note: coudamin check [place] [address] [facility] [address]  
[address] [facility] [address] [address] 5/18/2017  9:00 AM  
ECHO CARDIOGRAMS 2D with JAIR KERR  
[facility] ([facility] SCHEDULING) Appt Note: echo per dr x  
 [address] [facility] [address]  
[phone]  
  
   
 [address] [facility] [address] [facility] [address] [address]  
  
    
 6/6/2017  2:00 PM  
ESTABLISHED PATIENT with Ancelmo Blakely MD  
[facility] ([facility]) Appt Note: 1 mo fup  
 [address] [facility] [address]  
[phone]  
  
   
 [address] [facility] [address] [address]  
  
    
 6/16/2017 10:15 AM  
ACUTE CARE with Elisabeth Portillo MD  
[facility] Appt Note: coumadin check [place] [address] [address] [facility] [address]  
[address]  
  
    
 7/27/2017  9:00 AM  
ROUTINE CARE with Elisabeth Portillo MD  
[facility] Appt Note: Follow up visit [place] [address] [facility] [address]  
[address] [address]  
  
    
 10/16/2017  2:20 PM  
ESTABLISHED PATIENT with Ancelmo Blakely MD  
[facility] ([facility] SCHEDULING) Appt Note: annual  
 [address] [place] [address]  
[phone] Upcoming Health Maintenance Date Due DTaP/Tdap/Td series (1 - Tdap) 9/17/1974 PAP AKA CERVICAL CYTOLOGY 9/17/1974 ZOSTER VACCINE AGE 60> 9/17/2013 FOOT EXAM Q1 7/24/2016 COLONOSCOPY 10/26/2016 BREAST CANCER SCRN MAMMOGRAM 8/26/2017 LIPID PANEL Q1 7/25/2017 INFLUENZA AGE 9 TO ADULT 8/1/2017 HEMOGLOBIN A1C Q6M 9/29/2017 EYE EXAM RETINAL OR DILATED Q1 10/24/2017 MICROALBUMIN Q1 5/4/2018 Allergies as of 5/16/2017  In Progress On: 5/16/2017 By: Samanta Peraza LPN Severity Noted Reaction Type Reactions Amoxicillin  12/02/2010    Unknown (comments) Over 500 mg gives patient yeast infections Current Immunizations  Reviewed on 11/23/2016 Name Date Pneumococcal Polysaccharide (PPSV-23) 10/27/2015 Not reviewed this visit Vitals BP Pulse Temp Resp Height(growth percentile) Weight(growth percentile) 142/69 64 98.3 °F (36.8 °C) (Oral) 20 5' (1.524 m) 189 lb (85.7 kg) SpO2 BMI OB Status Smoking Status 97% 36.91 kg/m2 Postmenopausal Former Smoker Vitals History BMI and BSA Data Body Mass Index Body Surface Area  
 36.91 kg/m 2 1.9 m 2 Preferred Pharmacy Pharmacy Name Phone [id number] [place], [address] [phone] Your Updated Medication List  
  
   
This list is accurate as of: 5/16/17 10:51 AM.  Always use your most recent med list.  
  
  
  
  
 aspirin delayed-release 81 mg tablet Take  by mouth daily. atorvastatin 40 mg tablet Commonly known as:  LIPITOR  
TAKE 1 TAB BY MOUTH EVERY EVENING. glimepiride 1 mg tablet Commonly known as:  AMARYL  
TAKE 1 TABLET BY MOUTH EVERY DAY BEFORE BREAKFAST  
  
 glucose blood VI test strips strip Commonly known as:  CONTOUR NEXT STRIPS Use one test strip daily  
  
 metFORMIN 1,000 mg tablet Commonly known as:  GLUCOPHAGE  
 TAKE 1 TAB BY MOUTH TWO (2) TIMES DAILY (WITH MEALS). metoprolol succinate 50 mg XL tablet Commonly known as:  TOPROL-XL Take 1 Tab by mouth daily. NITROLINGUAL 400 mcg/spray spray Generic drug:  nitroglycerin 1-2 Sprays at onset of attack onto or under tongue no more than 3 sprays in 15 minutes  
  
 ramipril 10 mg capsule Commonly known as:  ALTACE  
TAKE 2 CAPSULES BY MOUTH EVERY MORNING  
  
 warfarin 5 mg tablet Commonly known as:  COUMADIN Take 1 Tab by mouth daily. Patient Instructions MyChart Activation Thank you for requesting access to Kippt. Please follow the instructions below to securely access and download your online medical record. Kippt allows you to send messages to your doctor, view your test results, renew your prescriptions, schedule appointments, and more. How Do I Sign Up? 1. In your internet browser, go to [email or web address] 
2. Click on the First Time User? Click Here link in the Sign In box. You will be redirect to the New Member Sign Up page. 3. Enter your Kippt Access Code exactly as it appears below. You will not need to use this code after youve completed the sign-up process. If you do not sign up before the expiration date, you must request a new code. Kippt Access Code: [email or web address] Expires: 2017  3:31 PM (This is the date your Kippt access code will ) 4. Enter the last four digits of your Social Security Number (xxxx) and Date of Birth (mm/dd/yyyy) as indicated and click Submit. You will be taken to the next sign-up page. 5. Create a Kippt ID. This will be your Kippt login ID and cannot be changed, so think of one that is secure and easy to remember. 6. Create a Kippt password. You can change your password at any time. 7. Enter your Password Reset Question and Answer. This can be used at a later time if you forget your password. 8. Enter your e-mail address. You will receive e-mail notification when new information is available in [address] 9. Click Sign Up. You can now view and download portions of your medical record. 10. Click the Download Summary menu link to download a portable copy of your medical information. Additional Information If you have questions, please visit the Frequently Asked Questions section of the [employer] website at [email or web address] Remember, [employer] is NOT to be used for urgent needs. For medical emergencies, dial 911. Introducing [facility]! Maria Estherdylan Fonsecamichelle introduces [employer] patient portal. Now you can access parts of your medical record, email your doctor's office, and request medication refills online. 1. In your internet browser, go to [email or web address] 2. Click on the First Time User? Click Here link in the Sign In box. You will see the New Member Sign Up page. 3. Enter your [employer] Access Code exactly as it appears below. You will not need to use this code after youve completed the sign-up process. If you do not sign up before the expiration date, you must request a new code. · [employer] Access Code: [email or web address] Expires: 6/25/2017  3:31 PM 
 
4. Enter the last four digits of your Social Security Number (xxxx) and Date of Birth (mm/dd/yyyy) as indicated and click Submit. You will be taken to the next sign-up page. 5. Create a [employer] ID. This will be your [employer] login ID and cannot be changed, so think of one that is secure and easy to remember. 6. Create a [employer] password. You can change your password at any time. 7. Enter your Password Reset Question and Answer. This can be used at a later time if you forget your password. 8. Enter your e-mail address. You will receive e-mail notification when new information is available in [address] 9. Click Sign Up. You can now view and download portions of your medical record. 10. Click the Download Summary menu link to download a portable copy of your medical information. If you have questions, please visit the Frequently Asked Questions section of the [employer] website. Remember, [employer]pi is NOT to be used for urgent needs. For medical emergencies, dial 911. Now available from your iPhone and Android! Please provide this summary of care documentation to your next provider. Your primary care clinician is listed as Kadie Villarreal. If you have any questions after today's visit, please call [phone].

## 2017-05-16 NOTE — PATIENT INSTRUCTIONS
OffScale Activation    Thank you for requesting access to Off[employer]. Please follow the instructions below to securely access and download your online medical record. OffScale allows you to send messages to your doctor, view your test results, renew your prescriptions, schedule appointments, and more. How Do I Sign Up? 1. In your internet browser, go to [email or web address]  2. Click on the First Time User? Click Here link in the Sign In box. You will be redirect to the New Member Sign Up page. 3. Enter your OffScale Access Code exactly as it appears below. You will not need to use this code after youve completed the sign-up process. If you do not sign up before the expiration date, you must request a new code. OffScale Access Code: [email or web address]  Expires: 2017  3:31 PM (This is the date your OffScale access code will )    4. Enter the last four digits of your Social Security Number (xxxx) and Date of Birth (mm/dd/yyyy) as indicated and click Submit. You will be taken to the next sign-up page. 5. Create a OffScale ID. This will be your OffScale login ID and cannot be changed, so think of one that is secure and easy to remember. 6. Create a OffScale password. You can change your password at any time. 7. Enter your Password Reset Question and Answer. This can be used at a later time if you forget your password. 8. Enter your e-mail address. You will receive e-mail notification when new information is available in [address] 9. Click Sign Up. You can now view and download portions of your medical record. 10. Click the Download Summary menu link to download a portable copy of your medical information. Additional Information    If you have questions, please visit the Frequently Asked Questions section of the OffScale website at [email or web address] OnHand/[email or web address] [email or web address] Remember, OffScale is NOT to be used for urgent needs. For medical emergencies, dial 911.

## 2017-05-16 NOTE — PROGRESS NOTES
Reviewed record in preparation for visit and have obtained necessary documentation. Identified pt with two pt identifiers(name and ). Health Maintenance Due   Topic    DTaP/Tdap/Td series (1 - Tdap)    PAP AKA CERVICAL CYTOLOGY     ZOSTER VACCINE AGE 60>     FOOT EXAM Q1     COLONOSCOPY     BREAST CANCER SCRN MAMMOGRAM          No chief complaint on file. Wt Readings from Last 3 Encounters:   17 189 lb (85.7 kg)   17 192 lb (87.1 kg)   17 193 lb 9.6 oz (87.8 kg)     Temp Readings from Last 3 Encounters:   17 98.3 °F (36.8 °C) (Oral)   17 98.5 °F (36.9 °C) (Oral)   17 98.5 °F (36.9 °C) (Oral)     BP Readings from Last 3 Encounters:   17 142/69   17 137/77   17 114/62     Pulse Readings from Last 3 Encounters:   17 90   17 60   17 (!) 103           Learning Assessment:  :     Learning Assessment 2017   PRIMARY LEARNER Patient Patient   BARRIERS PRIMARY LEARNER - NONE   CO-LEARNER CAREGIVER - No   PRIMARY LANGUAGE ENGLISH ENGLISH   LEARNER PREFERENCE PRIMARY LISTENING LISTENING     - VIDEOS   ANSWERED BY self patient   RELATIONSHIP SELF SELF       Depression Screening:  :     PHQ over the last two weeks 2017   Little interest or pleasure in doing things Not at all   Feeling down, depressed or hopeless Not at all   Total Score PHQ 2 0       Fall Risk Assessment:  :     No flowsheet data found. Abuse Screening:  :     Abuse Screening Questionnaire 2017   Do you ever feel afraid of your partner? N   Are you in a relationship with someone who physically or mentally threatens you? N   Is it safe for you to go home?  Y       Coordination of Care Questionnaire:  :     1) Have you been to an emergency room, urgent care clinic since your last visit? no   Hospitalized since your last visit? no             2) Have you seen or consulted any other health care providers outside of [address] [place] [address] since your last visit? no  (Include any pap smears or colon screenings in this section.)    3) Do you have an Advance Directive on file? no    4) Are you interested in receiving information on Advance Directives? YES      Patient is accompanied by self I have received verbal consent from Emile to discuss any/all medical information while they are present in the room.

## 2017-05-16 NOTE — PROGRESS NOTES
HISTORY OF PRESENT ILLNESS  Saeed Santiago is a 59 y.o. female. HPI  Patient presents to the office to have her coumadin checked. She reports she has been taking 7.5 mg daily. She denies problems with the medication. ROS   Denies concerns  Blood pressure 142/69, pulse 64, temperature 98.3 °F (36.8 °C), temperature source Oral, resp. rate 20, height 5' (1.524 m), weight 189 lb (85.7 kg), SpO2 97 %. Physical Exam   Constitutional: She appears well-developed and well-nourished. No distress. Pulmonary/Chest: Effort normal and breath sounds normal.     Results for orders placed or performed in visit on 05/16/17   AMB POC PT/INR   Result Value Ref Range    VALID INTERNAL CONTROL POC Yes     Prothrombin time (POC) 31.8 sec    INR POC 2.7        ASSESSMENT and PLAN  Carline Petty was seen today for anticoagulation. Diagnoses and all orders for this visit:    Encounter for monitoring coumadin therapy  -     AMB POC PT/INR    advised the patient to continue with her current dose of 7.5mg daily. She was given some handouts about coumadin and vitamin k diet.  She will follow up in one week to recheck her numbers

## 2017-05-18 ENCOUNTER — CLINICAL SUPPORT (OUTPATIENT)
Dept: CARDIOLOGY CLINIC | Age: 62
End: 2017-05-18

## 2017-05-18 DIAGNOSIS — I10 BENIGN HYPERTENSION: ICD-10-CM

## 2017-05-18 DIAGNOSIS — E78.5 DYSLIPIDEMIA: ICD-10-CM

## 2017-05-18 DIAGNOSIS — I48.91 ATRIAL FIBRILLATION, UNSPECIFIED TYPE (HCC): Primary | ICD-10-CM

## 2017-05-18 DIAGNOSIS — E11.59 TYPE 2 DIABETES MELLITUS WITH OTHER CIRCULATORY COMPLICATION: ICD-10-CM

## 2017-05-18 DIAGNOSIS — I25.10 CORONARY ARTERY DISEASE INVOLVING NATIVE CORONARY ARTERY OF NATIVE HEART WITHOUT ANGINA PECTORIS: ICD-10-CM

## 2017-05-23 ENCOUNTER — OFFICE VISIT (OUTPATIENT)
Dept: INTERNAL MEDICINE CLINIC | Age: 62
End: 2017-05-23

## 2017-05-23 VITALS
BODY MASS INDEX: 37.11 KG/M2 | HEART RATE: 68 BPM | RESPIRATION RATE: 15 BRPM | OXYGEN SATURATION: 99 % | SYSTOLIC BLOOD PRESSURE: 164 MMHG | HEIGHT: 60 IN | WEIGHT: 189 LBS | DIASTOLIC BLOOD PRESSURE: 81 MMHG | TEMPERATURE: 97.9 F

## 2017-05-23 DIAGNOSIS — I48.21 PERMANENT ATRIAL FIBRILLATION (HCC): Primary | ICD-10-CM

## 2017-05-23 LAB
INR BLD: 3.2
PT POC: 38.4 SECONDS
VALID INTERNAL CONTROL?: YES

## 2017-05-23 RX ORDER — WARFARIN 6 MG/1
6 TABLET ORAL DAILY
Qty: 30 TAB | Refills: 5 | Status: SHIPPED | OUTPATIENT
Start: 2017-05-23 | End: 2017-11-14 | Stop reason: SDUPTHER

## 2017-05-23 NOTE — PROGRESS NOTES
Echo completed and in the system for review. The patient is taking warfarin 7.5 mg daily.     Results for orders placed or performed in visit on 05/23/17   AMB POC PT/INR   Result Value Ref Range    VALID INTERNAL CONTROL POC Yes     Prothrombin time (POC) 38.4 seconds    INR POC 3.2

## 2017-05-23 NOTE — MR AVS SNAPSHOT
Visit Information Date & Time Provider Department Dept. Phone Encounter #  
 5/23/2017  8:45 AM Azalea Li MD [facility] [phone] [id number] Your Appointments 6/6/2017  2:00 PM  
ESTABLISHED PATIENT with Anjana Luke MD  
[facility] ([facility] SCHEDULING) Appt Note: 1 mo fup  
 [address] [facility] [address] [facility] [address] [facility] [address]  
[phone]  
  
   
 [address] [facility] [address] [place] [address]  
  
    
 6/16/2017 10:15 AM  
ACUTE CARE with Azalea Li MD  
[facility] Internal Medicine [facility]) Appt Note: coumadin check [place] [address] [facility] [address]  
[address]  
  
    
 7/27/2017  9:00 AM  
ROUTINE CARE with Azalea Li MD  
[facility]) Appt Note: Follow up visit [place] [address] [facility] [address]  
[address] [address]  
  
    
 10/16/2017  2:20 PM  
ESTABLISHED PATIENT with Anjana Luke MD  
[facility] ([facility] SCHEDULING) Appt Note: annual  
 [address] [facility] [address] [facility]  
[phone] Upcoming Health Maintenance Date Due DTaP/Tdap/Td series (1 - Tdap) 9/17/1974 PAP AKA CERVICAL CYTOLOGY 9/17/1974 ZOSTER VACCINE AGE 60> 9/17/2013 FOOT EXAM Q1 7/24/2016 COLONOSCOPY 10/26/2016 BREAST CANCER SCRN MAMMOGRAM 8/26/2017 LIPID PANEL Q1 7/25/2017 INFLUENZA AGE 9 TO ADULT 8/1/2017 HEMOGLOBIN A1C Q6M 9/29/2017 EYE EXAM RETINAL OR DILATED Q1 10/24/2017 MICROALBUMIN Q1 5/4/2018 Allergies as of 5/23/2017  Review Complete On: 5/23/2017 By: Amol Guerrier LPN Severity Noted Reaction Type Reactions Amoxicillin  12/02/2010    Unknown (comments) Over 500 mg gives patient yeast infections Current Immunizations  Reviewed on 11/23/2016 Name Date Pneumococcal Polysaccharide (PPSV-23) 10/27/2015 Not reviewed this visit You Were Diagnosed With   
  
 Codes Comments Permanent atrial fibrillation ([facility] 75.)    -  Primary ICD-10-CM: Z03.3 ICD-9-CM: 427.31 Vitals BP Pulse Temp Resp Height(growth percentile) Weight(growth percentile) 164/81 (BP 1 Location: Left arm, BP Patient Position: Sitting) 68 97.9 °F (36.6 °C) (Oral) 15 5' (1.524 m) 189 lb (85.7 kg) SpO2 BMI OB Status Smoking Status 99% 36.91 kg/m2 Postmenopausal Former Smoker Vitals History BMI and BSA Data Body Mass Index Body Surface Area  
 36.91 kg/m 2 1.9 m 2 Preferred Pharmacy Pharmacy Name Phone [id number] [place], [address] [phone] Your Updated Medication List  
  
   
This list is accurate as of: 5/23/17  9:01 AM.  Always use your most recent med list.  
  
  
  
  
 aspirin delayed-release 81 mg tablet Take  by mouth daily. atorvastatin 40 mg tablet Commonly known as:  LIPITOR  
TAKE 1 TAB BY MOUTH EVERY EVENING. glimepiride 1 mg tablet Commonly known as:  AMARYL  
TAKE 1 TABLET BY MOUTH EVERY DAY BEFORE BREAKFAST  
  
 glucose blood VI test strips strip Commonly known as:  CONTOUR NEXT STRIPS Use one test strip daily  
  
 metFORMIN 1,000 mg tablet Commonly known as:  GLUCOPHAGE  
TAKE 1 TAB BY MOUTH TWO (2) TIMES DAILY (WITH MEALS). metoprolol succinate 50 mg XL tablet Commonly known as:  TOPROL-XL Take 1 Tab by mouth daily. NITROLINGUAL 400 mcg/spray spray Generic drug:  nitroglycerin 1-2 Sprays at onset of attack onto or under tongue no more than 3 sprays in 15 minutes  
  
 ramipril 10 mg capsule Commonly known as:  ALTACE  
TAKE 2 CAPSULES BY MOUTH EVERY MORNING  
  
 warfarin 6 mg tablet Commonly known as:  COUMADIN Take 1 Tab by mouth daily. Prescriptions Sent to Pharmacy Refills  
 warfarin (COUMADIN) 6 mg tablet 5 Sig: Take 1 Tab by mouth daily. Class: Normal  
 Pharmacy: [address] [facility] ([place], [place]), [address]  #: [phone] Route: Oral  
  
Introducing [facility] & [facility] SERVICES! Deneen Harrison introduces [employer] patient portal. Now you can access parts of your medical record, email your doctor's office, and request medication refills online. 1. In your internet browser, go to [email or web address] 2. Click on the First Time User? Click Here link in the Sign In box. You will see the New Member Sign Up page. 3. Enter your [employer] Access Code exactly as it appears below. You will not need to use this code after youve completed the sign-up process. If you do not sign up before the expiration date, you must request a new code. · [employer] Access Code: [email or web address] Expires: 6/25/2017  3:31 PM 
 
4. Enter the last four digits of your Social Security Number (xxxx) and Date of Birth (mm/dd/yyyy) as indicated and click Submit. You will be taken to the next sign-up page. 5. Create a We[employer] ID. This will be your We[employer] login ID and cannot be changed, so think of one that is secure and easy to remember. 6. Create a [employer] password. You can change your password at any time. 7. Enter your Password Reset Question and Answer. This can be used at a later time if you forget your password. 8. Enter your e-mail address. You will receive e-mail notification when new information is available in [address] 9. Click Sign Up. You can now view and download portions of your medical record. 10. Click the Download Summary menu link to download a portable copy of your medical information.  
 
If you have questions, please visit the Frequently Asked Questions section of the [employer]. Remember, [employer] is NOT to be used for urgent needs. For medical emergencies, dial 911. Now available from your iPhone and Android! Please provide this summary of care documentation to your next provider. Your primary care clinician is listed as Chika Still. If you have any questions after today's visit, please call [phone].

## 2017-05-23 NOTE — PROGRESS NOTES
Tasha Barba is a 59 y.o. female who presents today for Anticoagulation monitoring. Indication: Atrial Fibrillation  INR Goal: 2.0-3.0. Current dose:  Coumadin 7.5mg daily. Missed Coumadin Doses:  None  Medication Changes:  no  Dietary Changes:  no    Symptoms: taking coumadin appropriately without any bleeding. Latest INRs:  Lab Results   Component Value Date/Time    INR POC 2.7 05/16/2017 10:30 AM    INR POC 1.2 05/09/2017 10:45 AM        New Coumadin dose:.the following changes are made - 6 mg is new dose. Next check to be scheduled for  4 weeks.

## 2017-06-06 ENCOUNTER — OFFICE VISIT (OUTPATIENT)
Dept: CARDIOLOGY CLINIC | Age: 62
End: 2017-06-06

## 2017-06-06 VITALS
BODY MASS INDEX: 37.46 KG/M2 | HEIGHT: 60 IN | HEART RATE: 64 BPM | RESPIRATION RATE: 16 BRPM | OXYGEN SATURATION: 98 % | DIASTOLIC BLOOD PRESSURE: 68 MMHG | WEIGHT: 190.8 LBS | SYSTOLIC BLOOD PRESSURE: 128 MMHG

## 2017-06-06 DIAGNOSIS — E78.5 DYSLIPIDEMIA: ICD-10-CM

## 2017-06-06 DIAGNOSIS — I67.9 CEREBROVASCULAR DISEASE: ICD-10-CM

## 2017-06-06 DIAGNOSIS — I73.9 SUBCLAVIAN ARTERY DISEASE (HCC): ICD-10-CM

## 2017-06-06 DIAGNOSIS — I10 BENIGN HYPERTENSION: ICD-10-CM

## 2017-06-06 DIAGNOSIS — I48.0 PAROXYSMAL ATRIAL FIBRILLATION (HCC): ICD-10-CM

## 2017-06-06 DIAGNOSIS — I25.10 CORONARY ARTERY DISEASE INVOLVING NATIVE CORONARY ARTERY OF NATIVE HEART WITHOUT ANGINA PECTORIS: Primary | ICD-10-CM

## 2017-06-06 DIAGNOSIS — I73.9 PERIPHERAL VASCULAR DISEASE (HCC): ICD-10-CM

## 2017-06-06 DIAGNOSIS — E11.59 TYPE 2 DIABETES MELLITUS WITH OTHER CIRCULATORY COMPLICATION: ICD-10-CM

## 2017-06-06 NOTE — PROGRESS NOTES
Subjective:     Problem List  Date Reviewed: 6/6/2017          Codes Class Noted    Atrial fibrillation ([facility] ICD-10-CM: I48.91  ICD-9-CM: 427.31  5/8/2017    Overview Addendum 6/6/2017  2:18 PM by Merlin Alba, MD                    Cerebrovascular disease ICD-10-CM: I67.9  ICD-9-CM: 437.9  10/14/2016    Overview Signed 10/14/2016  1:25 PM by Merlin Alba, MD A. Carotid Duplex (9/7/16): Mod JACKIE, mild LICA, < 17% left SC. Type 2 diabetes mellitus with circulatory disorder (HCC) ICD-10-CM: E11.59  ICD-9-CM: 250.70  8/4/2016        Subclavian artery disease ([facility] 75.) ICD-10-CM: I77.9  ICD-9-CM: 447.9  8/4/2016        Peripheral vascular disease ([facility] 75.) ICD-10-CM: I73.9  ICD-9-CM: 443.9  9/11/2012    Overview Signed 9/11/2012  3:43 PM by Merlin Alba, MD A. Right SFA stent (11/29/10):  6x150. B. Left LE bypass (11/2010). Coronary artery disease ICD-10-CM: I25.10  ICD-9-CM: 414.00  Unknown    Overview Addendum 5/22/2017 12:58 PM by Merlin Alba, MD A.  PCI pLAD (12/14/6):  3.0x18 Cypher. B.  Cath (7/29/8):  LAD d30; D1 ost95 (small). OM1 diffuse 60. RCA p30, d70; Bifurcating PDA ost/prox90. EF 70%. No AVG/MR. C.  Echo (5/18/17):  EF 65%, pseudo. Mod dil LA. Mild MR. PASP 38. Dyslipidemia ICD-10-CM: E78.5  ICD-9-CM: 272.4  Unknown    Overview Addendum 10/14/2016  1:01 PM by Merlin Alba, MD A. FLP (7/24/15): Tot 173, TG 88, HDL 73, LDL 82 (Lipitor 40). B.  FLP (7/25/16): Tot 161, TG 93, HDL 65, LDL 77 (Lipitor 40). Benign hypertension ICD-10-CM: I10  ICD-9-CM: 401.1  Unknown              Ms. Romina Bo is a 59 y.o. woman with the above past medical history, who presents for follow up of her atrial fibrillation and coronary artery disease. She is doing well from a cardiac standpoint.   She denies any chest pain, chest discomfort, shortness of breath, dyspnea on exertion, orthopnea, paroxysmal nocturnal dyspnea, lower extremity swelling, palpitations, syncope or near syncope. She is currently in normal sinus rhythm. Her Coumadin level is being monitored by Dr. Brenda Dumont. History   Smoking Status    Former Smoker   Smokeless Tobacco    Not on file       Current Outpatient Prescriptions   Medication Sig Dispense Refill    warfarin (COUMADIN) 6 mg tablet Take 1 Tab by mouth daily. 30 Tab 5    glimepiride (AMARYL) 1 mg tablet TAKE 1 TABLET BY MOUTH EVERY DAY BEFORE BREAKFAST 30 Tab 5    aspirin delayed-release 81 mg tablet Take  by mouth daily.  metoprolol succinate (TOPROL-XL) 50 mg XL tablet Take 1 Tab by mouth daily. 90 Tab 1    atorvastatin (LIPITOR) 40 mg tablet TAKE 1 TAB BY MOUTH EVERY EVENING. 90 Tab 1    ramipril (ALTACE) 10 mg capsule TAKE 2 CAPSULES BY MOUTH EVERY MORNING 180 Cap 1    metFORMIN (GLUCOPHAGE) 1,000 mg tablet TAKE 1 TAB BY MOUTH TWO (2) TIMES DAILY (WITH MEALS). 60 Tab 5    glucose blood VI test strips (CONTOUR NEXT STRIPS) strip Use one test strip daily 100 Strip 11    nitroglycerin (NITROLINGUAL) 0.4 mg/Dose spray 1-2 Sprays at onset of attack onto or under tongue no more than 3 sprays in 15 minutes          Objective:     Visit Vitals    /68 (BP 1 Location: Left arm)    Pulse 64    Resp 16    Ht 5' (1.524 m)    Wt 190 lb 12.8 oz (86.5 kg)    SpO2 98%    BMI 37.26 kg/m2       HEENT Exam:     Normocephalic, atraumatic. EOMI. Oropharynx negative. Neck supple. No lymphadenopathy. Lung Exam:     The patient is not dyspneic. There is no cough. The lungs are clear to percussion. Breath sounds are heard equally in all lung fields. There are no wheezes, rales, rhonchi, or rubs heard on auscultation. Heart Exam:     The rhythm is regular. The PMI is in the 5th intercostal space of the MCL. Apical impulse is normal. S1 is regular. S2 is physiologic. There is no S3, S4 gallop, murmur, click, or rub. Abdomen Exam:     Bowel sounds are normoactive. Abdomen benign. Extremities Exam:     The extremities are atraumatic appearing. There is no clubbing, cyanosis, edema, ulcers, varicose veins, rash, swelling, erythemia noted in the extremities. The neurovascular status is grossly intact with normal distal sensation and pulses. Vascular Exam:     The radial, brachial, dorsalis pedis, posterior tibial, are equal and strong bilaterally The carotids are equal bilaterally without bruits. EKG: Cathy King Assessment/Plan:     Ms. Yessica Gavin appears stable from a cardiac standpoint. We are going to stay the course with her current medication regimen, and she is going to follow up with me at her upcoming appointment in October. We discussed diet and exercise, and also reviewed the newer anticoagulants. Plan:  1. Continue outpatient medication regimen. 2. Follow up with me in four months time. 3. Diet and exercise. 4. Call my office, call her primary care physician, or return to the hospital should any concerning symptomatology arise. Ms. Yessica Gavin indicated that she understood this plan and wished to proceed ahead.              Patient Care Team:  Maxx Dubois MD as PCP - General (Internal Medicine)  Hang Ann MD (Vascular Surgery)  Saba Blanchard MD as Physician (Cardiology)

## 2017-06-06 NOTE — MR AVS SNAPSHOT
Visit Information Date & Time Provider Department Dept. Phone Encounter #  
 6/6/2017  2:00 PM Ancelmo Blakely MD [facility] [phone] [id number] Your Appointments 6/16/2017 10:15 AM  
ACUTE CARE with Elisabeth Portillo MD  
[facility] [address]) Appt Note: coumadin check [place] [address] [facility] [address]  
[address]  
  
    
 7/27/2017  9:00 AM  
ROUTINE CARE with Elisabeth Portillo MD  
[facility] [address]) Appt Note: Follow up visit [place] [address] [facility] [address]  
[address] [address]  
  
    
 10/16/2017  2:20 PM  
ESTABLISHED PATIENT with Ancelmo Blakely MD  
[facility] ([facility]) Appt Note: annual  
 [address] [facility] [address] [address] [address] Upcoming Health Maintenance Date Due DTaP/Tdap/Td series (1 - Tdap) 9/17/1974 PAP AKA CERVICAL CYTOLOGY 9/17/1974 ZOSTER VACCINE AGE 60> 9/17/2013 FOOT EXAM Q1 7/24/2016 COLONOSCOPY 10/26/2016 BREAST CANCER SCRN MAMMOGRAM 8/26/2017 LIPID PANEL Q1 7/25/2017 INFLUENZA AGE 9 TO ADULT 8/1/2017 HEMOGLOBIN A1C Q6M 9/29/2017 EYE EXAM RETINAL OR DILATED Q1 10/24/2017 MICROALBUMIN Q1 5/4/2018 Allergies as of 6/6/2017  Review Complete On: 6/6/2017 By: Eladio Villarreal Severity Noted Reaction Type Reactions Amoxicillin  12/02/2010    Unknown (comments) Over 500 mg gives patient yeast infections Current Immunizations  Reviewed on 11/23/2016 Name Date Pneumococcal Polysaccharide (PPSV-23) 10/27/2015 Not reviewed this visit Vitals BP Pulse Resp Height(growth percentile) Weight(growth percentile) SpO2  
 128/68 (BP 1 Location: Left arm) 64 16 5' (1.524 m) 190 lb 12.8 oz (86.5 kg) 98% BMI OB Status Smoking Status 37.26 kg/m2 Postmenopausal Former Smoker Vitals History BMI and BSA Data Body Mass Index Body Surface Area  
 37.26 kg/m 2 1.91 m 2 Preferred Pharmacy Pharmacy Name Phone [id number] [place], [address] [phone] Your Updated Medication List  
  
   
This list is accurate as of: 6/6/17  2:01 PM.  Always use your most recent med list.  
  
  
  
  
 aspirin delayed-release 81 mg tablet Take  by mouth daily. atorvastatin 40 mg tablet Commonly known as:  LIPITOR  
TAKE 1 TAB BY MOUTH EVERY EVENING. glimepiride 1 mg tablet Commonly known as:  AMARYL  
TAKE 1 TABLET BY MOUTH EVERY DAY BEFORE BREAKFAST  
  
 glucose blood VI test strips strip Commonly known as:  CONTOUR NEXT STRIPS Use one test strip daily  
  
 metFORMIN 1,000 mg tablet Commonly known as:  GLUCOPHAGE  
TAKE 1 TAB BY MOUTH TWO (2) TIMES DAILY (WITH MEALS). metoprolol succinate 50 mg XL tablet Commonly known as:  TOPROL-XL Take 1 Tab by mouth daily. NITROLINGUAL 400 mcg/spray spray Generic drug:  nitroglycerin 1-2 Sprays at onset of attack onto or under tongue no more than 3 sprays in 15 minutes  
  
 ramipril 10 mg capsule Commonly known as:  ALTACE  
TAKE 2 CAPSULES BY MOUTH EVERY MORNING  
  
 warfarin 6 mg tablet Commonly known as:  COUMADIN Take 1 Tab by mouth daily. Introducing [facility] & HEALTH SERVICES! [employer] introduces [employer] patient portal. Now you can access parts of your medical record, email your doctor's office, and request medication refills online. 1. In your internet browser, go to [email or web address] 2. Click on the First Time User? Click Here link in the Sign In box. You will see the New Member Sign Up page. 3. Enter your [employer] Access Code exactly as it appears below. You will not need to use this code after youve completed the sign-up process. If you do not sign up before the expiration date, you must request a new code. · [employer] Access Code: [email or web address] Expires: 6/25/2017  3:31 PM 
 
4. Enter the last four digits of your Social Security Number (xxxx) and Date of Birth (mm/dd/yyyy) as indicated and click Submit. You will be taken to the next sign-up page. 5. Create a [employer] ID. This will be your [employer] login ID and cannot be changed, so think of one that is secure and easy to remember. 6. Create a [employer] password. You can change your password at any time. 7. Enter your Password Reset Question and Answer. This can be used at a later time if you forget your password. 8. Enter your e-mail address. You will receive e-mail notification when new information is available in [address] 9. Click Sign Up. You can now view and download portions of your medical record. 10. Click the Download Summary menu link to download a portable copy of your medical information. If you have questions, please visit the Frequently Asked Questions section of the [employer] website. Remember, [employer] is NOT to be used for urgent needs. For medical emergencies, dial 911. Now available from your iPhone and Android! Please provide this summary of care documentation to your next provider. Your primary care clinician is listed as Teagan Rm. If you have any questions after today's visit, please call [phone].

## 2017-06-16 ENCOUNTER — OFFICE VISIT (OUTPATIENT)
Dept: INTERNAL MEDICINE CLINIC | Age: 62
End: 2017-06-16

## 2017-06-16 VITALS
HEART RATE: 63 BPM | RESPIRATION RATE: 15 BRPM | BODY MASS INDEX: 37.5 KG/M2 | WEIGHT: 191 LBS | TEMPERATURE: 97.9 F | HEIGHT: 60 IN | DIASTOLIC BLOOD PRESSURE: 80 MMHG | OXYGEN SATURATION: 99 % | SYSTOLIC BLOOD PRESSURE: 154 MMHG

## 2017-06-16 DIAGNOSIS — I48.20 CHRONIC ATRIAL FIBRILLATION (HCC): Primary | ICD-10-CM

## 2017-06-16 LAB
INR BLD: 2.3
PT POC: 27.9 SECONDS
VALID INTERNAL CONTROL?: YES

## 2017-06-16 NOTE — PROGRESS NOTES
Ancelmo Andrews is a 59 y.o. female who presents today for Anticoagulation monitoring. Indication: Atrial Fibrillation  INR Goal: 2.0-3.0. Current dose:  Coumadin 6mg daily. Missed Coumadin Doses:  None  Medication Changes:  no  Dietary Changes:  no    Symptoms: taking coumadin appropriately without any bleeding. Latest INRs:  Lab Results   Component Value Date/Time    INR POC 3.2 05/23/2017 09:02 AM    INR POC 2.7 05/16/2017 10:30 AM    INR POC 1.2 05/09/2017 10:45 AM        New Coumadin dose:.current treatment plan is effective, no change in therapy. Next check to be scheduled for  5 weeks. Farshad Beckett was seen today for anticoagulation.     Diagnoses and all orders for this visit:    Chronic atrial fibrillation (HCC)  -     AMB POC PT/INR

## 2017-06-16 NOTE — PROGRESS NOTES
Results for orders placed or performed in visit on 06/16/17   AMB POC PT/INR   Result Value Ref Range    VALID INTERNAL CONTROL POC Yes     Prothrombin time (POC) 27.9 seconds    INR POC 2.3      The patient is taking warfarin 6 mg daily.

## 2017-06-16 NOTE — MR AVS SNAPSHOT
Visit Information Date & Time Provider Department Dept. Phone Encounter #  
 6/16/2017 10:15 AM Nic Martin MD [facility] [phone] [id number] Follow-up Instructions Return in about 1 month (around 7/16/2017). Your Appointments 7/27/2017  9:00 AM  
ROUTINE CARE with Nic Martin MD  
[facility]) Appt Note: Follow up visit [place] [address] [facility] [address]  
[address] [address]  
  
    
 10/16/2017  2:20 PM  
ESTABLISHED PATIENT with Brendan Jeffers MD  
[facility] ([facility]) Appt Note: annual  
 [address] [facility] [address] [address] [facility]  
[address] [facility] [address] [address] Upcoming Health Maintenance Date Due DTaP/Tdap/Td series (1 - Tdap) 9/17/1974 PAP AKA CERVICAL CYTOLOGY 9/17/1974 ZOSTER VACCINE AGE 60> 9/17/2013 FOOT EXAM Q1 7/24/2016 COLONOSCOPY 10/26/2016 BREAST CANCER SCRN MAMMOGRAM 8/26/2017 LIPID PANEL Q1 7/25/2017 INFLUENZA AGE 9 TO ADULT 8/1/2017 HEMOGLOBIN A1C Q6M 9/29/2017 EYE EXAM RETINAL OR DILATED Q1 10/24/2017 MICROALBUMIN Q1 5/4/2018 Allergies as of 6/16/2017  Review Complete On: 6/16/2017 By: Elizabeth Glover LPN Severity Noted Reaction Type Reactions Amoxicillin  12/02/2010    Unknown (comments) Over 500 mg gives patient yeast infections Current Immunizations  Reviewed on 11/23/2016 Name Date Pneumococcal Polysaccharide (PPSV-23) 10/27/2015 Not reviewed this visit You Were Diagnosed With   
  
 Codes Comments Chronic atrial fibrillation (HCC)    -  Primary ICD-10-CM: D64.0 ICD-9-CM: 427.31 Vitals BP Pulse Temp Resp Height(growth percentile) Weight(growth percentile) 154/80 63 97.9 °F (36.6 °C) (Oral) 15 5' (1.524 m) 191 lb (86.6 kg) SpO2 BMI OB Status Smoking Status 99% 37.3 kg/m2 Postmenopausal Former Smoker Vitals History BMI and BSA Data Body Mass Index Body Surface Area  
 37.3 kg/m 2 1.91 m 2 Preferred Pharmacy Pharmacy Name Phone [id number] [place], [address] [phone] Your Updated Medication List  
  
   
This list is accurate as of: 6/16/17 10:25 AM.  Always use your most recent med list.  
  
  
  
  
 aspirin delayed-release 81 mg tablet Take  by mouth daily. atorvastatin 40 mg tablet Commonly known as:  LIPITOR  
TAKE 1 TAB BY MOUTH EVERY EVENING. glimepiride 1 mg tablet Commonly known as:  AMARYL  
TAKE 1 TABLET BY MOUTH EVERY DAY BEFORE BREAKFAST  
  
 glucose blood VI test strips strip Commonly known as:  CONTOUR NEXT STRIPS Use one test strip daily  
  
 metFORMIN 1,000 mg tablet Commonly known as:  GLUCOPHAGE  
TAKE 1 TAB BY MOUTH TWO (2) TIMES DAILY (WITH MEALS). metoprolol succinate 50 mg XL tablet Commonly known as:  TOPROL-XL Take 1 Tab by mouth daily. NITROLINGUAL 400 mcg/spray spray Generic drug:  nitroglycerin 1-2 Sprays at onset of attack onto or under tongue no more than 3 sprays in 15 minutes  
  
 ramipril 10 mg capsule Commonly known as:  ALTACE  
TAKE 2 CAPSULES BY MOUTH EVERY MORNING  
  
 warfarin 6 mg tablet Commonly known as:  COUMADIN Take 1 Tab by mouth daily. We Performed the Following AMB POC PT/INR [64173 CPT(R)] Follow-up Instructions Return in about 1 month (around 7/16/2017). Introducing [facility] & HEALTH SERVICES! Jaymie Westbrook introduces [employer] patient portal. Now you can access parts of your medical record, email your doctor's office, and request medication refills online.    
 
1. In your internet browser, go to [email or web address] 2. Click on the First Time User? Click Here link in the Sign In box. You will see the New Member Sign Up page. 3. Enter your [facility] Access Code exactly as it appears below. You will not need to use this code after youve completed the sign-up process. If you do not sign up before the expiration date, you must request a new code. · [facility] Access Code: [email or web address] Expires: 6/25/2017  3:31 PM 
 
4. Enter the last four digits of your Social Security Number (xxxx) and Date of Birth (mm/dd/yyyy) as indicated and click Submit. You will be taken to the next sign-up page. 5. Create a [employer] ID. This will be your [facility] [employer] login ID and cannot be changed, so think of one that is secure and easy to remember. 6. Create a [employer] password. You can change your password at any time. 7. Enter your Password Reset Question and Answer. This can be used at a later time if you forget your password. 8. Enter your e-mail address. You will receive e-mail notification when new information is available in [address] 9. Click Sign Up. You can now view and download portions of your medical record. 10. Click the Download Summary menu link to download a portable copy of your medical information. If you have questions, please visit the Frequently Asked Questions section of the [employer] website. Remember, [facility] is NOT to be used for urgent needs. For medical emergencies, dial 911. Now available from your iPhone and Android! Please provide this summary of care documentation to your next provider. Your primary care clinician is listed as Ravin Fung. If you have any questions after today's visit, please call [phone].

## 2017-07-16 RX ORDER — METFORMIN HYDROCHLORIDE 1000 MG/1
TABLET ORAL
Qty: 60 TAB | Refills: 5 | Status: SHIPPED | OUTPATIENT
Start: 2017-07-16 | End: 2018-01-11 | Stop reason: SDUPTHER

## 2017-07-18 DIAGNOSIS — I10 BENIGN HYPERTENSION: ICD-10-CM

## 2017-07-18 RX ORDER — RAMIPRIL 10 MG/1
CAPSULE ORAL
Qty: 180 CAP | Refills: 1 | Status: SHIPPED | OUTPATIENT
Start: 2017-07-18 | End: 2018-01-13 | Stop reason: SDUPTHER

## 2017-07-27 ENCOUNTER — OFFICE VISIT (OUTPATIENT)
Dept: INTERNAL MEDICINE CLINIC | Age: 62
End: 2017-07-27

## 2017-07-27 VITALS
BODY MASS INDEX: 37.5 KG/M2 | OXYGEN SATURATION: 98 % | WEIGHT: 191 LBS | SYSTOLIC BLOOD PRESSURE: 147 MMHG | RESPIRATION RATE: 16 BRPM | HEART RATE: 58 BPM | DIASTOLIC BLOOD PRESSURE: 78 MMHG | HEIGHT: 60 IN

## 2017-07-27 DIAGNOSIS — I73.9 PERIPHERAL VASCULAR DISEASE (HCC): ICD-10-CM

## 2017-07-27 DIAGNOSIS — E11.59 TYPE 2 DIABETES MELLITUS WITH OTHER CIRCULATORY COMPLICATION: ICD-10-CM

## 2017-07-27 DIAGNOSIS — H81.10 BENIGN PAROXYSMAL VERTIGO, UNSPECIFIED LATERALITY: ICD-10-CM

## 2017-07-27 DIAGNOSIS — Z12.11 SCREEN FOR COLON CANCER: ICD-10-CM

## 2017-07-27 DIAGNOSIS — Z12.39 BREAST CANCER SCREENING: ICD-10-CM

## 2017-07-27 DIAGNOSIS — I10 BENIGN HYPERTENSION: ICD-10-CM

## 2017-07-27 DIAGNOSIS — E78.5 DYSLIPIDEMIA: ICD-10-CM

## 2017-07-27 DIAGNOSIS — I48.20 CHRONIC ATRIAL FIBRILLATION (HCC): Primary | ICD-10-CM

## 2017-07-27 LAB
INR BLD: 2.1
PT POC: 25.1 SECONDS
VALID INTERNAL CONTROL?: YES

## 2017-07-27 NOTE — MR AVS SNAPSHOT
Visit Information Date & Time Provider Department Dept. Phone Encounter #  
 7/27/2017  9:00 AM Crow Dixon MD [facility] [phone] [id number] Your Appointments 10/16/2017  2:20 PM  
ESTABLISHED PATIENT with Tayo Coelho MD  
[facility] ([facility] SCHEDULING) Appt Note: annual  
 [address] [facility] [address] [address] [address] [facility] Up[facility] Health Maintenance Date Due DTaP/Tdap/Td series (1 - Tdap) 9/17/1974 PAP AKA CERVICAL CYTOLOGY 9/17/1974 ZOSTER VACCINE AGE 60> 7/17/2013 FOOT EXAM Q1 7/24/2016 COLONOSCOPY 10/26/2016 LIPID PANEL Q1 7/25/2017 BREAST CANCER SCRN MAMMOGRAM 8/26/2017 INFLUENZA AGE 9 TO ADULT 8/1/2017 HEMOGLOBIN A1C Q6M 9/29/2017 EYE EXAM RETINAL OR DILATED Q1 10/24/2017 MICROALBUMIN Q1 5/4/2018 Allergies as of 7/27/2017  Review Complete On: 6/16/2017 By: Mary Ann Boogie LPN Severity Noted Reaction Type Reactions Amoxicillin  12/02/2010    Unknown (comments) Over 500 mg gives patient yeast infections Current Immunizations  Reviewed on 7/27/2017 Name Date Pneumococcal Polysaccharide (PPSV-23) 10/27/2015 Reviewed by Crow Dixon MD on 7/27/2017 at  9:16 AM  
 Reviewed by Crow Dixon MD on 7/27/2017 at  9:21 AM  
You Were Diagnosed With   
  
 Codes Comments Chronic atrial fibrillation (HCC)    -  Primary ICD-10-CM: M91.8 ICD-9-CM: 427.31 Dyslipidemia     ICD-10-CM: E78.5 ICD-9-CM: 272.4 Benign hypertension     ICD-10-CM: I10 
ICD-9-CM: 401.1 Peripheral vascular disease ([facility] [address]     ICD-10-CM: I73.9 ICD-9-CM: 443.9 Screen for colon cancer     ICD-10-CM: Z12.11 ICD-9-CM: V76.51 Breast cancer screening     ICD-10-CM: Z12.39 
ICD-9-CM: V76.10  Type 2 diabetes mellitus with other circulatory complication (HCC) ICD-10-CM: E11.59 
ICD-9-CM: 250.70 Vitals BP Pulse Resp Height(growth percentile) Weight(growth percentile) SpO2  
 147/78 (BP Patient Position: Standing) (!) 58 16 5' (1.524 m) 191 lb (86.6 kg) 98% BMI OB Status Smoking Status 37.3 kg/m2 Postmenopausal Former Smoker Vitals History BMI and BSA Data Body Mass Index Body Surface Area  
 37.3 kg/m 2 1.91 m 2 Preferred Pharmacy Pharmacy Name Phone [id number] [place], [address] [phone] Your Updated Medication List  
  
   
This list is accurate as of: 7/27/17  9:32 AM.  Always use your most recent med list.  
  
  
  
  
 aspirin delayed-release 81 mg tablet Take  by mouth daily. atorvastatin 40 mg tablet Commonly known as:  LIPITOR  
TAKE 1 TAB BY MOUTH EVERY EVENING. glimepiride 1 mg tablet Commonly known as:  AMARYL  
TAKE 1 TABLET BY MOUTH EVERY DAY BEFORE BREAKFAST  
  
 glucose blood VI test strips strip Commonly known as:  CONTOUR NEXT STRIPS Use one test strip daily  
  
 metFORMIN 1,000 mg tablet Commonly known as:  GLUCOPHAGE  
TAKE 1 TAB BY MOUTH TWO (2) TIMES DAILY (WITH MEALS). metoprolol succinate 50 mg XL tablet Commonly known as:  TOPROL-XL Take 1 Tab by mouth daily. NITROLINGUAL 400 mcg/spray spray Generic drug:  nitroglycerin 1-2 Sprays at onset of attack onto or under tongue no more than 3 sprays in 15 minutes  
  
 ramipril 10 mg capsule Commonly known as:  ALTACE  
TAKE 2 CAPSULES BY MOUTH EVERY MORNING  
  
 warfarin 6 mg tablet Commonly known as:  COUMADIN Take 1 Tab by mouth daily. We Performed the Following AMB POC PT/INR [59016 CPT(R)] CBC WITH AUTOMATED DIFF [39744 CPT(R)] HEMOGLOBIN A1C W/O EAG [48238 CPT(R)]  DIABETES FOOT EXAM [HM7 Custom] LIPID PANEL [53192 CPT(R)] METABOLIC PANEL, COMPREHENSIVE [03154 CPT(R)] MICROALBUMIN, UR, RAND W/ MICROALBUMIN/CREA RATIO [id number] CPT(R)] REFERRAL TO GASTROENTEROLOGY [PTW90 Custom] Comments:  
 Please evaluate patient for routine [place] Daily To-Do List   
 08/27/2017 Imaging:  [facility] MAMMO BI SCREENING INCL CAD Referral Information Referral ID Referred By Referred To  
  
 [id number] PAYAM [id number] [facility] [address]  [place], [phone] [facility] Visits Status Start Date End Date 1 New Request 7/27/17 7/27/18 If your referral has a status of pending review or denied, additional information will be sent to support the outcome of this decision. Introducing [facility]! Anny Gomez introduces [employer] patient portal. Now you can access parts of your medical record, email your doctor's office, and request medication refills online. 1. In your internet browser, go to [email or web address] 2. Click on the First Time User? Click Here link in the Sign In box. You will see the New Member Sign Up page. 3. Enter your [employer] Access Code exactly as it appears below. You will not need to use this code after youve completed the sign-up process. If you do not sign up before the expiration date, you must request a new code. · [employer] Access Code: [email or web address] Expires: 10/25/2017  9:32 AM 
 
4. Enter the last four digits of your Social Security Number (xxxx) and Date of Birth (mm/dd/yyyy) as indicated and click Submit. You will be taken to the next sign-up page. 5. Create a [employer] ID. This will be your [employer] login ID and cannot be changed, so think of one that is secure and easy to remember. 6. Create a [employer] password. You can change your password at any time. 7. Enter your Password Reset Question and Answer. This can be used at a later time if you forget your password. 8. Enter your e-mail address.  You will receive e-mail notification when new information is available in [employer]. 9. Click Sign Up. You can now view and download portions of your medical record. 10. Click the Download Summary menu link to download a portable copy of your medical information. If you have questions, please visit the Frequently Asked Questions section of the [employer] website. Remember, [employer] is NOT to be used for urgent needs. For medical emergencies, dial 911. Now available from your iPhone and Android! Please provide this summary of care documentation to your next provider. Your primary care clinician is listed as Garner Najjar. If you have any questions after today's visit, please call [phone].

## 2017-07-27 NOTE — PROGRESS NOTES
Coordination of Care Questions    1. Have you been to the ER, urgent care clinic since your last visit? No       Hospitalized since your last visit? No    2. Have you seen or consulted any other health care providers outside of the [address] [place] [address] since your last visit? Include any pap smears or colon screening.  No

## 2017-07-27 NOTE — PROGRESS NOTES
Yesenia Trotter is a 59 y.o. female who presents today for Anticoagulation monitoring. Indication: Atrial Fibrillation  INR Goal: 2.0-3.0. Current dose:  Coumadin 6mg daily. Missed Coumadin Doses:  None  Medication Changes:  no  Dietary Changes:  no    Symptoms: taking coumadin appropriately without any bleeding. Latest INRs:  Lab Results   Component Value Date/Time    INR POC 2.1 07/27/2017 09:05 AM    INR POC 2.3 06/16/2017 10:19 AM    INR POC 3.2 05/23/2017 09:02 AM        New Coumadin dose:.current treatment plan is effective, no change in therapy. Next check to be scheduled for  5 weeks. Patient Active Problem List    Diagnosis    Atrial fibrillation ([facility] [address]             Cerebrovascular disease     A. Carotid Duplex (9/7/16): Mod JACKIE, mild LICA, < 41% left SC.  Type 2 diabetes mellitus with circulatory disorder (HCC)    Subclavian artery disease (HCC)    Peripheral vascular disease (HCC)     A. Right SFA stent (11/29/10):  6x150. B. Left LE bypass (11/2010).  Coronary artery disease     A. PCI pLAD (12/14/6):  3.0x18 Cypher. B.  Cath (7/29/8):  LAD d30; D1 ost95 (small). OM1 diffuse 60. RCA p30, d70; Bifurcating PDA ost/prox90. EF 70%. No AVG/MR. C.  Echo (5/18/17):  EF 65%, pseudo. Mod dil LA. Mild MR. PASP 38.  Dyslipidemia     A. FLP (7/24/15): Tot 173, TG 88, HDL 73, LDL 82 (Lipitor 40). B.  FLP (7/25/16): Tot 161, TG 93, HDL 65, LDL 77 (Lipitor 40).  Benign hypertension     SUBJECTIVE: Yesenia Trotter is a 59 y.o. female seen for a follow up visit; she has diabetes, hypertension, hyperlipidemia, coronary artery disease and peripheral vascular disease. Current Outpatient Prescriptions   Medication Sig Dispense Refill    ramipril (ALTACE) 10 mg capsule TAKE 2 CAPSULES BY MOUTH EVERY MORNING 180 Cap 1    metFORMIN (GLUCOPHAGE) 1,000 mg tablet TAKE 1 TAB BY MOUTH TWO (2) TIMES DAILY (WITH MEALS).  60 Tab 5    warfarin (COUMADIN) 6 mg tablet Take 1 Tab by mouth daily. 30 Tab 5    glimepiride (AMARYL) 1 mg tablet TAKE 1 TABLET BY MOUTH EVERY DAY BEFORE BREAKFAST 30 Tab 5    metoprolol succinate (TOPROL-XL) 50 mg XL tablet Take 1 Tab by mouth daily. 90 Tab 1    atorvastatin (LIPITOR) 40 mg tablet TAKE 1 TAB BY MOUTH EVERY EVENING. 90 Tab 1    glucose blood VI test strips (CONTOUR NEXT STRIPS) strip Use one test strip daily 100 Strip 11    aspirin delayed-release 81 mg tablet Take  by mouth daily.  nitroglycerin (NITROLINGUAL) 0.4 mg/Dose spray 1-2 Sprays at onset of attack onto or under tongue no more than 3 sprays in 15 minutes        Patient Active Problem List   Diagnosis Code    Coronary artery disease I25.10    Dyslipidemia E78.5    Benign hypertension I10    Peripheral vascular disease (HCC) I73.9    Type 2 diabetes mellitus with circulatory disorder (HCC) E11.59    Subclavian artery disease (HCC) I77.9    Cerebrovascular disease I67.9    Atrial fibrillation ([facility]) I48.91     System Review: Cardiovascular ROS - taking medications as instructed, side effects noted by patient include no medication side effects noted and episode dizziness 2 weeks ago when getting oob, no TIA's, no chest pain on exertion, no dyspnea on exertion, no swelling of ankles, Diabetic ROS - medication compliance: compliant all of the time, diabetic diet compliance: compliant all of the time, home glucose monitoring: is performed sporadically, fasting values range 150. New concerns: dizziness resolved was spinning may have been vertigo.      OBJECTIVE:  Visit Vitals    /78 (BP Patient Position: Standing)    Pulse (!) 58    Resp 16    Ht 5' (1.524 m)    Wt 191 lb (86.6 kg)    SpO2 98%    BMI 37.3 kg/m2    Extended / Orthostatic Vitals:    Vital Signs  Pulse (Heart Rate): (!) 58 (07/27/17 0859)  Resp Rate: 16 (07/27/17 0859)  BP: 147/78 (07/27/17 0911)  BP Patient Position: Standing (07/27/17 0911)         Oxygen Therapy  O2 Sat (%): 98 % (07/27/17 5613)    Appearance: alert, well appearing, and in no distress and overweight. General exam: CVS exam BP noted to be borderline elevated today in office, S1, S2 normal, no gallop, no murmur, chest clear, no JVD, no HSM, no edema. Diabetic foot exam:     Left: Reflexes      Vibratory sensation normal    Proprioception normal   Sharp/dull discrimination normal    Filament test normal sensation with micro filament   Pulse DP: 2+ (normal)   Pulse PT: 2+ (normal)   Deformities: None  Right: Reflexes    Vibratory sensation normal   Proprioception normal   Sharp/dull discrimination normal   Filament test normal sensation with micro filament   Pulse DP: 2+ (normal)   Pulse PT: 2+ (normal)   Deformities: None  Lab review: orders written for new lab studies as appropriate; see orders. ASSESSMENT:  diabetes stable, hypertension borderline controlled, hyperlipidemia control uncertain. PLAN:  current treatment plan is effective, no change in therapy  lab results and schedule of future lab studies reviewed with patient  repeat labs ordered prior to next appointment. Diagnoses and all orders for this visit:    1. Chronic atrial fibrillation (HCC)  -     AMB POC PT/INR    2. Dyslipidemia    3. Benign hypertension    4. Peripheral vascular disease ([address]    5. Screen for colon cancer  -     REFERRAL TO GASTROENTEROLOGY    6. Breast cancer screening  -     [facility] MAMMO BI SCREENING INCL CAD; Future    7. Type 2 diabetes mellitus with other circulatory complication (HCC)  -     CBC WITH AUTOMATED DIFF  -     LIPID PANEL  -     METABOLIC PANEL, COMPREHENSIVE  -     HEMOGLOBIN A1C W/O EAG  -     MICROALBUMIN, UR, RAND W/ MICROALBUMIN/CREA RATIO  -      DIABETES FOOT EXAM    8.  Benign paroxysmal vertigo, unspecified laterality        For vertigo   Has resolved now

## 2017-07-28 LAB
ALBUMIN SERPL-MCNC: 4.1 G/DL (ref 3.6–4.8)
ALBUMIN/CREAT UR: 7.2 MG/G CREAT (ref 0–30)
ALBUMIN/GLOB SERPL: 1.6 {RATIO} (ref 1.2–2.2)
ALP SERPL-CCNC: 100 IU/L (ref 39–117)
ALT SERPL-CCNC: 9 IU/L (ref 0–32)
AST SERPL-CCNC: 11 IU/L (ref 0–40)
BASOPHILS # BLD AUTO: 0 X10E3/UL (ref 0–0.2)
BASOPHILS NFR BLD AUTO: 0 %
BILIRUB SERPL-MCNC: 0.4 MG/DL (ref 0–1.2)
BUN SERPL-MCNC: 13 MG/DL (ref 8–27)
BUN/CREAT SERPL: 18 (ref 12–28)
CALCIUM SERPL-MCNC: 9.1 MG/DL (ref 8.7–10.3)
CHLORIDE SERPL-SCNC: 100 MMOL/L (ref 96–106)
CHOLEST SERPL-MCNC: 173 MG/DL (ref 100–199)
CO2 SERPL-SCNC: 27 MMOL/L (ref 18–29)
CREAT SERPL-MCNC: 0.73 MG/DL (ref 0.57–1)
CREAT UR-MCNC: 111.2 MG/DL
EOSINOPHIL # BLD AUTO: 0.1 X10E3/UL (ref 0–0.4)
EOSINOPHIL NFR BLD AUTO: 2 %
ERYTHROCYTE [DISTWIDTH] IN BLOOD BY AUTOMATED COUNT: 14 % (ref 12.3–15.4)
GLOBULIN SER CALC-MCNC: 2.5 G/DL (ref 1.5–4.5)
GLUCOSE SERPL-MCNC: 114 MG/DL (ref 65–99)
HBA1C MFR BLD: 7 % (ref 4.8–5.6)
HCT VFR BLD AUTO: 40.1 % (ref 34–46.6)
HDLC SERPL-MCNC: 65 MG/DL
HGB BLD-MCNC: 12.8 G/DL (ref 11.1–15.9)
IMM GRANULOCYTES # BLD: 0 X10E3/UL (ref 0–0.1)
IMM GRANULOCYTES NFR BLD: 0 %
INTERPRETATION, 910389: NORMAL
LDLC SERPL CALC-MCNC: 85 MG/DL (ref 0–99)
LYMPHOCYTES # BLD AUTO: 2 X10E3/UL (ref 0.7–3.1)
LYMPHOCYTES NFR BLD AUTO: 40 %
Lab: NORMAL
MCH RBC QN AUTO: 28.4 PG (ref 26.6–33)
MCHC RBC AUTO-ENTMCNC: 31.9 G/DL (ref 31.5–35.7)
MCV RBC AUTO: 89 FL (ref 79–97)
MICROALBUMIN UR-MCNC: 8 UG/ML
MONOCYTES # BLD AUTO: 0.3 X10E3/UL (ref 0.1–0.9)
MONOCYTES NFR BLD AUTO: 7 %
NEUTROPHILS # BLD AUTO: 2.6 X10E3/UL (ref 1.4–7)
NEUTROPHILS NFR BLD AUTO: 51 %
PLATELET # BLD AUTO: 311 X10E3/UL (ref 150–379)
POTASSIUM SERPL-SCNC: 4.4 MMOL/L (ref 3.5–5.2)
PROT SERPL-MCNC: 6.6 G/DL (ref 6–8.5)
RBC # BLD AUTO: 4.5 X10E6/UL (ref 3.77–5.28)
SODIUM SERPL-SCNC: 142 MMOL/L (ref 134–144)
TRIGL SERPL-MCNC: 114 MG/DL (ref 0–149)
VLDLC SERPL CALC-MCNC: 23 MG/DL (ref 5–40)
WBC # BLD AUTO: 5.1 X10E3/UL (ref 3.4–10.8)

## 2017-08-02 ENCOUNTER — HOSPITAL ENCOUNTER (OUTPATIENT)
Dept: MAMMOGRAPHY | Age: 62
Discharge: HOME | End: 2017-08-02
Attending: INTERNAL MEDICINE
Payer: COMMERCIAL

## 2017-08-02 DIAGNOSIS — Z12.39 BREAST CANCER SCREENING: ICD-10-CM

## 2017-08-02 PROCEDURE — 77067 SCR MAMMO BI INCL CAD: CPT

## 2017-08-31 ENCOUNTER — OFFICE VISIT (OUTPATIENT)
Dept: INTERNAL MEDICINE CLINIC | Age: 62
End: 2017-08-31

## 2017-08-31 VITALS
SYSTOLIC BLOOD PRESSURE: 131 MMHG | HEIGHT: 60 IN | DIASTOLIC BLOOD PRESSURE: 61 MMHG | OXYGEN SATURATION: 98 % | HEART RATE: 66 BPM | TEMPERATURE: 98 F | BODY MASS INDEX: 37.69 KG/M2 | RESPIRATION RATE: 16 BRPM | WEIGHT: 192 LBS

## 2017-08-31 DIAGNOSIS — R79.1 INR (INTERNATIONAL NORMAL RATIO) ABNORMAL: Primary | ICD-10-CM

## 2017-08-31 DIAGNOSIS — I48.20 CHRONIC ATRIAL FIBRILLATION (HCC): ICD-10-CM

## 2017-08-31 LAB
INR BLD: 2.1
PT POC: NORMAL SECONDS
VALID INTERNAL CONTROL?: YES

## 2017-08-31 NOTE — PROGRESS NOTES
Jeannie Perez is a 59 y.o. female who presents today for Anticoagulation monitoring. Indication: Atrial Fibrillation  INR Goal: 2.0-3.0. Current dose:  Coumadin 6mg daily. Missed Coumadin Doses:  None  Medication Changes:  no  Dietary Changes:  no    Symptoms: taking coumadin appropriately without any bleeding. Latest INRs:  Lab Results   Component Value Date/Time    INR POC 2.1 08/31/2017 10:06 AM    INR POC 2.1 07/27/2017 09:05 AM    INR POC 2.3 06/16/2017 10:19 AM        New Coumadin dose:.current treatment plan is effective, no change in therapy. Next check to be scheduled for  5 weeks. Diagnoses and all orders for this visit:    1.  INR (international normal ratio) abnormal  -     AMB POC PT/INR      Lab Results   Component Value Date/Time    Hemoglobin A1c 7.0 07/27/2017 09:50 AM    Hemoglobin A1c (POC) 6.7 11/23/2016 03:55 PM    Hemoglobin A1c, External 7.6 05/06/2015       2

## 2017-08-31 NOTE — MR AVS SNAPSHOT
Visit Information Date & Time Provider Department Dept. Phone Encounter #  
 8/31/2017  9:45 AM Hira Molina MD [facility] [phone] [id number] Your Appointments 10/16/2017  2:20 PM  
ESTABLISHED PATIENT with Kailash Steward MD  
[facility] ([facility] SCHEDULING) Appt Note: annual  
 [address] [place] [address] [facility]  
[address] [place] [address] [address] Upcoming Health Maintenance Date Due DTaP/Tdap/Td series (1 - Tdap) 9/17/1974 PAP AKA CERVICAL CYTOLOGY 9/17/1974 ZOSTER VACCINE AGE 60> 7/17/2013 COLONOSCOPY 10/26/2016 INFLUENZA AGE 9 TO ADULT 8/1/2017 EYE EXAM RETINAL OR DILATED Q1 10/24/2017 HEMOGLOBIN A1C Q6M 1/27/2018 FOOT EXAM Q1 7/27/2018 MICROALBUMIN Q1 7/27/2018 LIPID PANEL Q1 7/27/2018 BREAST CANCER SCRN MAMMOGRAM 8/2/2019 Allergies as of 8/31/2017  Review Complete On: 8/31/2017 By: sEa Martinez Severity Noted Reaction Type Reactions Amoxicillin  12/02/2010    Unknown (comments) Over 500 mg gives patient yeast infections Current Immunizations  Reviewed on 7/27/2017 Name Date Pneumococcal Polysaccharide (PPSV-23) 10/27/2015 Not reviewed this visit You Were Diagnosed With   
  
 Codes Comments INR (international normal ratio) abnormal    -  Primary ICD-10-CM: R79.1 ICD-9-CM: 790.92 Vitals BP Pulse Temp Resp Height(growth percentile) Weight(growth percentile) 131/61 (BP 1 Location: Right arm, BP Patient Position: Sitting) 66 98 °F (36.7 °C) (Oral) 16 5' (1.524 m) 192 lb (87.1 kg) SpO2 BMI OB Status Smoking Status 98% 37.5 kg/m2 Postmenopausal Former Smoker Vitals History BMI and BSA Data Body Mass Index Body Surface Area  
 37.5 kg/m 2 1.92 m 2 Preferred Pharmacy Pharmacy Name Phone [id number] [place], [address] [phone] Your Updated Medication List  
  
   
This list is accurate as of: 8/31/17 10:20 AM.  Always use your most recent med list.  
  
  
  
  
 aspirin delayed-release 81 mg tablet Take  by mouth daily. atorvastatin 40 mg tablet Commonly known as:  LIPITOR  
TAKE 1 TAB BY MOUTH EVERY EVENING. glimepiride 1 mg tablet Commonly known as:  AMARYL  
TAKE 1 TABLET BY MOUTH EVERY DAY BEFORE BREAKFAST  
  
 glucose blood VI test strips strip Commonly known as:  CONTOUR NEXT STRIPS Use one test strip daily  
  
 metFORMIN 1,000 mg tablet Commonly known as:  GLUCOPHAGE  
TAKE 1 TAB BY MOUTH TWO (2) TIMES DAILY (WITH MEALS). metoprolol succinate 50 mg XL tablet Commonly known as:  TOPROL-XL Take 1 Tab by mouth daily. NITROLINGUAL 400 mcg/spray spray Generic drug:  nitroglycerin 1-2 Sprays at onset of attack onto or under tongue no more than 3 sprays in 15 minutes  
  
 ramipril 10 mg capsule Commonly known as:  ALTACE  
TAKE 2 CAPSULES BY MOUTH EVERY MORNING  
  
 warfarin 6 mg tablet Commonly known as:  COUMADIN Take 1 Tab by mouth daily. We Performed the Following AMB POC PT/INR [73823 CPT(R)] Introducing [facility]! [employer] introduces [employer] patient portal. Now you can access parts of your medical record, email your doctor's office, and request medication refills online. 1. In your internet browser, go to [email or web address] 2. Click on the First Time User? Click Here link in the Sign In box. You will see the New Member Sign Up page. 3. Enter your [employer] Access Code exactly as it appears below. You will not need to use this code after youve completed the sign-up process. If you do not sign up before the expiration date, you must request a new code.  
 
· [employer] Access Code: [email or web address] 
 Expires: 10/25/2017  9:32 AM 
 
4. Enter the last four digits of your Social Security Number (xxxx) and Date of Birth (mm/dd/yyyy) as indicated and click Submit. You will be taken to the next sign-up page. 5. Create a [employer] ID. This will be your [employer] login ID and cannot be changed, so think of one that is secure and easy to remember. 6. Create a [employer] password. You can change your password at any time. 7. Enter your Password Reset Question and Answer. This can be used at a later time if you forget your password. 8. Enter your e-mail address. You will receive e-mail notification when new information is available in [address] 9. Click Sign Up. You can now view and download portions of your medical record. 10. Click the Download Summary menu link to download a portable copy of your medical information. If you have questions, please visit the Frequently Asked Questions section of the [employer] website. Remember, [employer] is NOT to be used for urgent needs. For medical emergencies, dial 911. Now available from your iPhone and Android! Please provide this summary of care documentation to your next provider. Your primary care clinician is listed as Ralph Blizzard. If you have any questions after today's visit, please call [phone].

## 2017-08-31 NOTE — PROGRESS NOTES
Homer Mckeon is a 59 y.o. female      Chief Complaint   Patient presents with    Coagulation disorder       1. Have you been to the ER, urgent care clinic since your last visit? Hospitalized since your last visit? No    2. Have you seen or consulted any other health care providers outside of the [address] [place] [address] since your last visit? Include any pap smears or colon screening.  No

## 2017-10-04 ENCOUNTER — OFFICE VISIT (OUTPATIENT)
Dept: INTERNAL MEDICINE CLINIC | Age: 62
End: 2017-10-04

## 2017-10-04 VITALS
SYSTOLIC BLOOD PRESSURE: 149 MMHG | RESPIRATION RATE: 16 BRPM | OXYGEN SATURATION: 98 % | HEART RATE: 58 BPM | DIASTOLIC BLOOD PRESSURE: 79 MMHG

## 2017-10-04 DIAGNOSIS — I48.20 CHRONIC ATRIAL FIBRILLATION (HCC): Primary | ICD-10-CM

## 2017-10-04 DIAGNOSIS — Z23 ENCOUNTER FOR IMMUNIZATION: ICD-10-CM

## 2017-10-04 LAB
INR BLD: 1.9
PT POC: 22.8 SECONDS
VALID INTERNAL CONTROL?: YES

## 2017-10-04 RX ORDER — WARFARIN 1 MG/1
1 TABLET ORAL DAILY
Qty: 30 TAB | Refills: 11 | Status: SHIPPED | OUTPATIENT
Start: 2017-10-04

## 2017-10-04 NOTE — MR AVS SNAPSHOT
Visit Information Date & Time Provider Department Dept. Phone Encounter #  
 10/4/2017 10:00 AM Tanna Mckenna MD Formerly [facility] [phone] [id number] Your Appointments 10/25/2017 11:00 AM  
ESTABLISHED PATIENT with Navdeep Kolb MD  
[facility] (KA[facility] SCHEDULING) Appt Note: annual; ANNUAL; annual r/s from 10/24  
 [email or web address] [address] 1007 [address] [facility] [address] [address] Upcoming Health Maintenance Date Due DTaP/Tdap/Td series (1 - Tdap) 9/17/1974 PAP AKA CERVICAL CYTOLOGY 9/17/1974 ZOSTER VACCINE AGE 60> 7/17/2013 COLONOSCOPY 10/26/2016 INFLUENZA AGE 9 TO ADULT 8/1/2017 EYE EXAM RETINAL OR DILATED Q1 10/24/2017 HEMOGLOBIN A1C Q6M 1/27/2018 FOOT EXAM Q1 7/27/2018 MICROALBUMIN Q1 7/27/2018 LIPID PANEL Q1 7/27/2018 BREAST CANCER SCRN MAMMOGRAM 8/2/2019 Allergies as of 10/4/2017  Review Complete On: 10/4/2017 By: Suellyn Collet, LPN Severity Noted Reaction Type Reactions Amoxicillin  12/02/2010    Unknown (comments) Over 500 mg gives patient yeast infections Current Immunizations  Reviewed on 7/27/2017 Name Date Influenza Vaccine (Quad) PF  Incomplete Pneumococcal Polysaccharide (PPSV-23) 10/27/2015 Not reviewed this visit You Were Diagnosed With   
  
 Codes Comments Chronic atrial fibrillation (HCC)    -  Primary ICD-10-CM: J31.1 ICD-9-CM: 427.31 Encounter for immunization     ICD-10-CM: O91 ICD-9-CM: V03.89 Vitals BP Pulse Resp LMP SpO2 OB Status 149/79 (!) 58 16 (LMP Unknown) 98% Postmenopausal  
 Smoking Status Former Smoker Vitals History Preferred Pharmacy Pharmacy Name Phone [id number] [place], [address] [phone] Your Updated Medication List  
  
   
This list is accurate as of: 10/4/17 10:50 AM.  Always use your most recent med list.  
  
  
  
  
 aspirin delayed-release 81 mg tablet Take  by mouth daily. atorvastatin 40 mg tablet Commonly known as:  LIPITOR  
TAKE 1 TAB BY MOUTH EVERY EVENING. glimepiride 1 mg tablet Commonly known as:  AMARYL  
TAKE 1 TABLET BY MOUTH EVERY DAY BEFORE BREAKFAST  
  
 glucose blood VI test strips strip Commonly known as:  CONTOUR NEXT STRIPS Use one test strip daily  
  
 metFORMIN 1,000 mg tablet Commonly known as:  GLUCOPHAGE  
TAKE 1 TAB BY MOUTH TWO (2) TIMES DAILY (WITH MEALS). metoprolol succinate 50 mg XL tablet Commonly known as:  TOPROL-XL Take 1 Tab by mouth daily. NITROLINGUAL 400 mcg/spray spray Generic drug:  nitroglycerin 1-2 Sprays at onset of attack onto or under tongue no more than 3 sprays in 15 minutes  
  
 ramipril 10 mg capsule Commonly known as:  ALTACE  
TAKE 2 CAPSULES BY MOUTH EVERY MORNING * warfarin 6 mg tablet Commonly known as:  COUMADIN Take 1 Tab by mouth daily. * warfarin 1 mg tablet Commonly known as:  COUMADIN Take 1 Tab by mouth daily. * Notice: This list has 2 medication(s) that are the same as other medications prescribed for you. Read the directions carefully, and ask your doctor or other care provider to review them with you. Prescriptions Sent to Pharmacy Refills  
 warfarin (COUMADIN) 1 mg tablet 11 Sig: Take 1 Tab by mouth daily. Class: Normal  
 Pharmacy: [address] [place] [address], [address]  #: [phone] Route: Oral  
  
We Performed the Following AMB POC PT/INR [66442 CPT(R)] INFLUENZA VIRUS VAC QUAD,SPLIT,PRESV FREE SYRINGE IM [id number] CPT(R)] Introducing [facility]!    
 [place] Yeung introduces EchoFirst patient portal. Now you can access parts of your medical record, email your doctor's office, and request medication refills online. 1. In your internet browser, go to [email or web address]/[email or web address] 2. Click on the First Time User? Click Here link in the Sign In box. You will see the New Member Sign Up page. 3. Enter your Cloudbot Access Code exactly as it appears below. You will not need to use this code after youve completed the sign-up process. If you do not sign up before the expiration date, you must request a new code. · Cloudbot Access Code: [email or web address] Expires: 10/25/2017  9:32 AM 
 
4. Enter the last four digits of your Social Security Number (xxxx) and Date of Birth (mm/dd/yyyy) as indicated and click Submit. You will be taken to the next sign-up page. 5. Create a Cloudbot ID. This will be your Cloudbot login ID and cannot be changed, so think of one that is secure and easy to remember. 6. Create a Cloudbot password. You can change your password at any time. 7. Enter your Password Reset Question and Answer. This can be used at a later time if you forget your password. 8. Enter your e-mail address. You will receive e-mail notification when new information is available in [address] 9. Click Sign Up. You can now view and download portions of your medical record. 10. Click the Download Summary menu link to download a portable copy of your medical information. If you have questions, please visit the Frequently Asked Questions section of the Cloudbot website. Remember, Cloudbot is NOT to be used for urgent needs. For medical emergencies, dial 911. Now available from your iPhone and Android! Please provide this summary of care documentation to your next provider. Your primary care clinician is listed as Tiny Aguilar. If you have any questions after today's visit, please call [phone].

## 2017-10-04 NOTE — PROGRESS NOTES
Chief Complaint   Patient presents with    Anticoagulation     6mg daily      Vanessa Martinez is a 57 y.o. female who presents today for Anticoagulation monitoring. Indication: Atrial Fibrillation  INR Goal: 2.0-3.0. Current dose:  Coumadin 6mg daily. Missed Coumadin Doses:  None  Medication Changes:  no  Dietary Changes:  no    Symptoms: taking coumadin appropriately without any bleeding. Latest INRs:  Lab Results   Component Value Date/Time    INR POC 1.9 10/04/2017 10:16 AM    INR POC 2.1 08/31/2017 10:06 AM    INR POC 2.1 07/27/2017 09:05 AM        New Coumadin dose:.current treatment plan is not effective, new dose 7 mg    Next check to be scheduled for  5 weeks.

## 2017-10-04 NOTE — PROGRESS NOTES
Coordination of Care Questions    1. Have you been to the ER, urgent care clinic since your last visit? No       Hospitalized since your last visit? No    2. Have you seen or consulted any other health care providers outside of the [address] [place] [address] since your last visit? Include any pap smears or colon screening.  No

## 2017-10-07 RX ORDER — ATORVASTATIN CALCIUM 40 MG/1
TABLET, FILM COATED ORAL
Qty: 90 TAB | Refills: 1 | Status: SHIPPED | OUTPATIENT
Start: 2017-10-07 | End: 2018-04-02 | Stop reason: SDUPTHER

## 2017-10-25 ENCOUNTER — OFFICE VISIT (OUTPATIENT)
Dept: CARDIOLOGY CLINIC | Age: 62
End: 2017-10-25

## 2017-10-25 VITALS
HEART RATE: 74 BPM | RESPIRATION RATE: 18 BRPM | DIASTOLIC BLOOD PRESSURE: 80 MMHG | HEIGHT: 60 IN | SYSTOLIC BLOOD PRESSURE: 134 MMHG | WEIGHT: 194 LBS | OXYGEN SATURATION: 97 % | BODY MASS INDEX: 38.09 KG/M2

## 2017-10-25 DIAGNOSIS — I67.9 CEREBROVASCULAR DISEASE: ICD-10-CM

## 2017-10-25 DIAGNOSIS — I48.20 CHRONIC ATRIAL FIBRILLATION (HCC): Primary | ICD-10-CM

## 2017-10-25 DIAGNOSIS — I73.9 PERIPHERAL VASCULAR DISEASE (HCC): ICD-10-CM

## 2017-10-25 DIAGNOSIS — E78.5 DYSLIPIDEMIA: ICD-10-CM

## 2017-10-25 DIAGNOSIS — E11.59 TYPE 2 DIABETES MELLITUS WITH OTHER CIRCULATORY COMPLICATION, WITHOUT LONG-TERM CURRENT USE OF INSULIN (HCC): ICD-10-CM

## 2017-10-25 DIAGNOSIS — I25.10 CORONARY ARTERY DISEASE INVOLVING NATIVE CORONARY ARTERY OF NATIVE HEART WITHOUT ANGINA PECTORIS: ICD-10-CM

## 2017-10-25 DIAGNOSIS — I10 BENIGN HYPERTENSION: ICD-10-CM

## 2017-10-25 DIAGNOSIS — I73.9 SUBCLAVIAN ARTERY DISEASE (HCC): ICD-10-CM

## 2017-10-25 NOTE — PROGRESS NOTES
Subjective:     Problem List  Date Reviewed: 10/25/2017          Codes Class Noted    Atrial fibrillation ([facility] ICD-10-CM: I48.91  ICD-9-CM: 427.31  5/8/2017    Overview Addendum 6/6/2017  2:18 PM by Alexander Rincon MD                    Cerebrovascular disease ICD-10-CM: I67.9  ICD-9-CM: 437.9  10/14/2016    Overview Signed 10/14/2016  1:25 PM by MD TYLER Quintero. Carotid Duplex (9/7/16): Mod JACKIE, mild LICA, < 12% left SC. Type 2 diabetes mellitus with circulatory disorder (HCC) ICD-10-CM: E11.59  ICD-9-CM: 250.70  8/4/2016        Subclavian artery disease ([facility] ICD-10-CM: I77.9  ICD-9-CM: 447.9  8/4/2016        Peripheral vascular disease ([facility] ICD-10-CM: I73.9  ICD-9-CM: 443.9  9/11/2012    Overview Signed 9/11/2012  3:43 PM by MD TYLER Quintero. Right SFA stent (11/29/10):  6x150. B. Left LE bypass (11/2010). Coronary artery disease ICD-10-CM: I25.10  ICD-9-CM: 414.00  Unknown    Overview Addendum 5/22/2017 12:58 PM by MD TYLER Quintero.  PCI pLAD (12/14/6):  3.0x18 Cypher. B.  Cath (7/29/8):  LAD d30; D1 ost95 (small). OM1 diffuse 60. RCA p30, d70; Bifurcating PDA ost/prox90. EF 70%. No AVG/MR. C.  Echo (5/18/17):  EF 65%, pseudo. Mod dil LA. Mild MR. PASP 38. Dyslipidemia ICD-10-CM: E78.5  ICD-9-CM: 272.4  Unknown    Overview Addendum 10/14/2016  1:01 PM by MD TYLER Quintero. FLP (7/24/15): Tot 173, TG 88, HDL 73, LDL 82 (Lipitor 40). B.  FLP (7/25/16): Tot 161, TG 93, HDL 65, LDL 77 (Lipitor 40). Benign hypertension ICD-10-CM: I10  ICD-9-CM: 401.1  Unknown              Ms. Ken Bar is 57 y.o. woman with the above past medical history, who presents for follow up of her atrial fibrillation, as well as coronary artery disease. She is doing well from a cardiac standpoint.   She denies any chest pain, chest discomfort, shortness of breath, dyspnea on exertion, orthopnea, paroxysmal nocturnal dyspnea, lower extremity swelling, palpitations, syncope or near syncope. History   Smoking Status    Former Smoker   Smokeless Tobacco    Former User       Current Outpatient Prescriptions   Medication Sig Dispense Refill    atorvastatin (LIPITOR) 40 mg tablet TAKE 1 TABLET BY MOUTH EVERY DAY IN THE EVENING 90 Tab 1    warfarin (COUMADIN) 1 mg tablet Take 1 Tab by mouth daily. 30 Tab 11    ramipril (ALTACE) 10 mg capsule TAKE 2 CAPSULES BY MOUTH EVERY MORNING 180 Cap 1    metFORMIN (GLUCOPHAGE) 1,000 mg tablet TAKE 1 TAB BY MOUTH TWO (2) TIMES DAILY (WITH MEALS). 60 Tab 5    warfarin (COUMADIN) 6 mg tablet Take 1 Tab by mouth daily. 30 Tab 5    glimepiride (AMARYL) 1 mg tablet TAKE 1 TABLET BY MOUTH EVERY DAY BEFORE BREAKFAST 30 Tab 5    aspirin delayed-release 81 mg tablet Take  by mouth daily.  metoprolol succinate (TOPROL-XL) 50 mg XL tablet Take 1 Tab by mouth daily. 90 Tab 1    glucose blood VI test strips (CONTOUR NEXT STRIPS) strip Use one test strip daily 100 Strip 11    nitroglycerin (NITROLINGUAL) 0.4 mg/Dose spray 1-2 Sprays at onset of attack onto or under tongue no more than 3 sprays in 15 minutes          Objective:     Visit Vitals    /80 (BP 1 Location: Left arm, BP Patient Position: Sitting)    Pulse 74    Resp 18    Ht 5' (1.524 m)    Wt 194 lb (88 kg)    LMP  (LMP Unknown)    SpO2 97%    BMI 37.89 kg/m2       HEENT Exam:     Normocephalic, atraumatic. EOMI. Oropharynx negative. Neck supple. No lymphadenopathy. Lung Exam:     The patient is not dyspneic. There is no cough. The lungs are clear to percussion. Breath sounds are heard equally in all lung fields. There are no wheezes, rales, rhonchi, or rubs heard on auscultation. Heart Exam:     The rhythm is regular. The PMI is in the 5th intercostal space of the MCL. Apical impulse is normal. S1 is regular. S2 is physiologic. There is no S3, S4 gallop, murmur, click, or rub.           Abdomen Exam:     Bowel sounds are normoactive. Abdomen benign. Extremities Exam:     The extremities are atraumatic appearing. There is no clubbing, cyanosis, edema, ulcers, varicose veins, rash, erythemia noted in the extremities. The neurovascular status is grossly intact with normal distal sensation and pulses. Vascular Exam:     The radial, brachial, dorsalis pedis, posterior tibial, are equal and strong bilaterally The carotids are equal bilaterally without bruits. EKG: NSR @ 73, no isch. Assessment/Plan:     Ms. Kaci Thibodeaux appears stable from a cardiac standpoint. We are going to stay the course with her current medication regimen, and she is going to follow up in one year's time. We discussed diet and exercise, and also we discussed vitamin K containing foods given that she is on Coumadin. Plan:  1. Continue outpatient medication regimen. 2. Follow up in one year's time. 3. Diet and exercise. 4. Call my office, call her primary care physician, or return to the hospital should any concerning symptomatology arise. Ms. Kaci Thibodeaux indicated that she understood this plan and wished to proceed ahead.              Patient Care Team:  Avis Bennett MD as PCP - General (Internal Medicine)  Mario Skinner MD (Vascular Surgery)  Jah Anglin MD as Physician (Cardiology)

## 2017-10-25 NOTE — PATIENT INSTRUCTIONS
[employer] Activation    Thank you for requesting access to [employer]. Please follow the instructions below to securely access and download your online medical record. [employer] allows you to send messages to your doctor, view your test results, renew your prescriptions, schedule appointments, and more. How Do I Sign Up? 1. In your internet browser, go to [email or web address]  2. Click on the First Time User? Click Here link in the Sign In box. You will be redirect to the New Member Sign Up page. 3. Enter your [employer] Access Code exactly as it appears below. You will not need to use this code after youve completed the sign-up process. If you do not sign up before the expiration date, you must request a new code. [employer] Access Code: [email or web address]  Expires: 2018 11:08 AM (This is the date your [employer] access code will )    4. Enter the last four digits of your Social Security Number (xxxx) and Date of Birth (mm/dd/yyyy) as indicated and click Submit. You will be taken to the next sign-up page. 5. Create a [employer] ID. This will be your [employer] login ID and cannot be changed, so think of one that is secure and easy to remember. 6. Create a [employer] password. You can change your password at any time. 7. Enter your Password Reset Question and Answer. This can be used at a later time if you forget your password. 8. Enter your e-mail address. You will receive e-mail notification when new information is available in [address] 9. Click Sign Up. You can now view and download portions of your medical record. 10. Click the Download Summary menu link to download a portable copy of your medical information. Additional Information    If you have questions, please visit the Frequently Asked Questions section of the [employer] website at [email or web address] Remember, [employer] is NOT to be used for urgent needs. For medical emergencies, dial 911.            Learning About Coronary Artery Disease (CAD)  What is coronary artery disease? Coronary artery disease (CAD) occurs when plaque builds up in the arteries that bring oxygen-rich blood to your heart. Plaque is a fatty substance made of cholesterol, calcium, and other substances in the blood. This process is called hardening of the arteries, or atherosclerosis. What happens when you have coronary artery disease? · Plaque may narrow the coronary arteries. Narrowed arteries cause poor blood flow. This can lead to angina symptoms such as chest pain or discomfort. If blood flow is completely blocked, you could have a heart attack. · You can slow CAD and reduce the risk of future problems by making changes in your lifestyle. These include quitting smoking and eating heart-healthy foods. · Treatments for CAD, along with changes in your lifestyle, can help you live a longer and healthier life. How can you prevent coronary artery disease? · Do not smoke. It may be the best thing you can do to prevent heart disease. If you need help quitting, talk to your doctor about stop-smoking programs and medicines. These can increase your chances of quitting for good. · Be active. Get at least 30 minutes of exercise on most days of the week. Walking is a good choice. You also may want to do other activities, such as running, swimming, cycling, or playing tennis or team sports. · Eat heart-healthy foods. Eat more fruits and vegetables and less foods that contain saturated and trans fats. Limit alcohol, sodium, and sweets. · Stay at a healthy weight. Lose weight if you need to. · Manage other health problems such as diabetes, high blood pressure, and high cholesterol. · Manage stress. Stress can hurt your heart. To keep stress low, talk about your problems and feelings. Don't keep your feelings hidden. · If you have talked about it with your doctor, take a low-dose aspirin every day.  Aspirin can help certain people lower their risk of a heart attack or stroke. But taking aspirin isn't right for everyone, because it can cause serious bleeding. Do not start taking daily aspirin unless your doctor knows about it. How is coronary artery disease treated? · Your doctor will suggest that you make lifestyle changes. For example, your doctor may ask you to eat healthy foods, quit smoking, lose extra weight, and be more active. · You will have to take medicines. · Your doctor may suggest a procedure to open narrowed or blocked arteries. This is called angioplasty. Or your doctor may suggest using healthy blood vessels to create detours around narrowed or blocked arteries. This is called bypass surgery. Follow-up care is a key part of your treatment and safety. Be sure to make and go to all appointments, and call your doctor if you are having problems. It's also a good idea to know your test results and keep a list of the medicines you take. Where can you learn more? Go to http://abril-herlinda.info/. Enter ([phone] in the search box to learn more about \"Learning About Coronary Artery Disease (CAD). \"  Current as of: December 28, 2016  Content Version: 11.3  © 7247-3497 [employer]. Care instructions adapted under license by [employer] (which disclaims liability or warranty for this information). If you have questions about a medical condition or this instruction, always ask your healthcare professional. [address] any warranty or liability for your use of this information.

## 2017-10-25 NOTE — MR AVS SNAPSHOT
Visit Information Date & Time Provider Department Dept. Phone Encounter #  
 10/25/2017 11:00 AM Ottoniel Terrell MD [facility] [place] [phone] [id number] Your Appointments 11/9/2017  3:15 PM  
ROUTINE CARE with Elly Thayer MD  
Formerly [facility] Micah Bonilla) Appt Note: 5 WEEK F/U  
 [place] [address]54 [address] [address] Upcoming Health Maintenance Date Due DTaP/Tdap/Td series (1 - Tdap) 9/17/1974 PAP AKA CERVICAL CYTOLOGY 9/17/1974 ZOSTER VACCINE AGE 60> 7/17/2013 COLONOSCOPY 10/26/2016 EYE EXAM RETINAL OR DILATED Q1 10/24/2017 HEMOGLOBIN A1C Q6M 1/27/2018 FOOT EXAM Q1 7/27/2018 MICROALBUMIN Q1 7/27/2018 LIPID PANEL Q1 7/27/2018 BREAST CANCER SCRN MAMMOGRAM 8/2/2019 Allergies as of 10/25/2017  Review Complete On: 10/25/2017 By: Ottoniel Terrell MD  
  
 Severity Noted Reaction Type Reactions Amoxicillin  12/02/2010    Unknown (comments) Over 500 mg gives patient yeast infections Current Immunizations  Reviewed on 10/4/2017 Name Date Influenza Vaccine (Quad) PF 10/4/2017 Pneumococcal Polysaccharide (PPSV-23) 10/27/2015 Not reviewed this visit You Were Diagnosed With   
  
 Codes Comments Chronic atrial fibrillation (HCC)    -  Primary ICD-10-CM: U04.3 ICD-9-CM: 427.31 Vitals BP Pulse Resp Height(growth percentile) Weight(growth percentile) LMP  
 134/80 (BP 1 Location: Left arm, BP Patient Position: Sitting) 74 18 5' (1.524 m) 194 lb (88 kg) (LMP Unknown) SpO2 BMI OB Status Smoking Status 97% 37.89 kg/m2 Postmenopausal Former Smoker Vitals History BMI and BSA Data Body Mass Index Body Surface Area  
 37.89 kg/m 2 1.93 m 2 Preferred Pharmacy Pharmacy Name Phone [id number], [address] [phone] Your Updated Medication List  
  
   
This list is accurate as of: 10/25/17 11:29 AM.  Always use your most recent med list.  
  
  
  
  
 aspirin delayed-release 81 mg tablet Take  by mouth daily. atorvastatin 40 mg tablet Commonly known as:  LIPITOR  
TAKE 1 TABLET BY MOUTH EVERY DAY IN THE EVENING  
  
 glimepiride 1 mg tablet Commonly known as:  AMARYL  
TAKE 1 TABLET BY MOUTH EVERY DAY BEFORE BREAKFAST  
  
 glucose blood VI test strips strip Commonly known as:  CONTOUR NEXT STRIPS Use one test strip daily  
  
 metFORMIN 1,000 mg tablet Commonly known as:  GLUCOPHAGE  
TAKE 1 TAB BY MOUTH TWO (2) TIMES DAILY (WITH MEALS). metoprolol succinate 50 mg XL tablet Commonly known as:  TOPROL-XL Take 1 Tab by mouth daily. NITROLINGUAL 400 mcg/spray spray Generic drug:  nitroglycerin 1-2 Sprays at onset of attack onto or under tongue no more than 3 sprays in 15 minutes  
  
 ramipril 10 mg capsule Commonly known as:  ALTACE  
TAKE 2 CAPSULES BY MOUTH EVERY MORNING * warfarin 6 mg tablet Commonly known as:  COUMADIN Take 1 Tab by mouth daily. * warfarin 1 mg tablet Commonly known as:  COUMADIN Take 1 Tab by mouth daily. * Notice: This list has 2 medication(s) that are the same as other medications prescribed for you. Read the directions carefully, and ask your doctor or other care provider to review them with you. We Performed the Following AMB POC EKG ROUTINE W/ 12 LEADS, INTER & REP [44716 CPT(R)] Introducing [facility]! Zayra Bain introduces [employer] patient portal. Now you can access parts of your medical record, email your doctor's office, and request medication refills online. 1. In your internet browser, go to [email or web address] 2. Click on the First Time User? Click Here link in the Sign In box. You will see the New Member Sign Up page. 3. Enter your Wormhole Access Code exactly as it appears below. You will not need to use this code after youve completed the sign-up process. If you do not sign up before the expiration date, you must request a new code. · Wormhole Access Code: [email or web address] Expires: 1/23/2018 11:08 AM 
 
4. Enter the last four digits of your Social Security Number (xxxx) and Date of Birth (mm/dd/yyyy) as indicated and click Submit. You will be taken to the next sign-up page. 5. Create a Wormhole ID. This will be your Wormhole login ID and cannot be changed, so think of one that is secure and easy to remember. 6. Create a Wormhole password. You can change your password at any time. 7. Enter your Password Reset Question and Answer. This can be used at a later time if you forget your password. 8. Enter your e-mail address. You will receive e-mail notification when new information is available in [address] 9. Click Sign Up. You can now view and download portions of your medical record. 10. Click the Download Summary menu link to download a portable copy of your medical information. If you have questions, please visit the Frequently Asked Questions section of the Wormhole website. Remember, Wormhole is NOT to be used for urgent needs. For medical emergencies, dial 911. Now available from your iPhone and Android! Please provide this summary of care documentation to your next provider. Your primary care clinician is listed as Vickie Winter. If you have any questions after today's visit, please call [phone].

## 2017-11-05 RX ORDER — METOPROLOL SUCCINATE 50 MG/1
TABLET, EXTENDED RELEASE ORAL
Qty: 90 TAB | Refills: 1 | Status: SHIPPED | OUTPATIENT
Start: 2017-11-05 | End: 2018-04-29 | Stop reason: SDUPTHER

## 2017-11-09 ENCOUNTER — OFFICE VISIT (OUTPATIENT)
Dept: INTERNAL MEDICINE CLINIC | Age: 62
End: 2017-11-09

## 2017-11-09 VITALS
DIASTOLIC BLOOD PRESSURE: 61 MMHG | HEART RATE: 68 BPM | SYSTOLIC BLOOD PRESSURE: 156 MMHG | HEIGHT: 67 IN | WEIGHT: 194.4 LBS | TEMPERATURE: 97.7 F | OXYGEN SATURATION: 98 % | RESPIRATION RATE: 17 BRPM | BODY MASS INDEX: 30.51 KG/M2

## 2017-11-09 DIAGNOSIS — I48.20 CHRONIC ATRIAL FIBRILLATION (HCC): Primary | ICD-10-CM

## 2017-11-09 LAB
INR BLD: 3.1
PT POC: 37.7 SECONDS
VALID INTERNAL CONTROL?: YES

## 2017-11-09 NOTE — PROGRESS NOTES
Chief Complaint   Patient presents with    Other     INR       1. Have you been to the ER, urgent care clinic since your last visit? Hospitalized since your last visit? No    2. Have you seen or consulted any other health care providers outside of the [facility] since your last visit? Include any pap smears or colon screening.  No

## 2017-11-09 NOTE — MR AVS SNAPSHOT
Visit Information Date & Time Provider Department Dept. Phone Encounter #  
 11/9/2017  3:15 PM Leonard Fonseca, [address] [facility] [phone] Your Appointments 10/31/2018 10:20 AM  
ESTABLISHED PATIENT with Rodrigo Jennings MD  
[facility] (KA[facility] SCHEDULING) Appt Note: ANNUAL  
 [address] [facility] [place] [phone] [place] [address] [address] [facility] [place] [phone] [address] Upcoming Health Maintenance Date Due DTaP/Tdap/Td series (1 - Tdap) 9/17/1974 PAP AKA CERVICAL CYTOLOGY 9/17/1974 ZOSTER VACCINE AGE 60> 7/17/2013 COLONOSCOPY 10/26/2016 EYE EXAM RETINAL OR DILATED Q1 10/24/2017 HEMOGLOBIN A1C Q6M 1/27/2018 FOOT EXAM Q1 7/27/2018 MICROALBUMIN Q1 7/27/2018 LIPID PANEL Q1 7/27/2018 BREAST CANCER SCRN MAMMOGRAM 8/2/2019 Allergies as of 11/9/2017  Review Complete On: 10/25/2017 By: Rodrigo Jennings MD  
  
 Severity Noted Reaction Type Reactions Amoxicillin  12/02/2010    Unknown (comments) Over 500 mg gives patient yeast infections Current Immunizations  Reviewed on 10/4/2017 Name Date Influenza Vaccine (Quad) PF 10/4/2017 Pneumococcal Polysaccharide (PPSV-23) 10/27/2015 Not reviewed this visit You Were Diagnosed With   
  
 Codes Comments Chronic atrial fibrillation (HCC)    -  Primary ICD-10-CM: Z35.9 ICD-9-CM: 427.31 Vitals BP Pulse Temp Resp Height(growth percentile) Weight(growth percentile) 156/61 (BP 1 Location: Left arm, BP Patient Position: Sitting) 68 97.7 °F (36.5 °C) (Oral) 17 5' 7\" (1.702 m) 194 lb 6.4 oz (88.2 kg) LMP SpO2 BMI OB Status Smoking Status (LMP Unknown) 98% 30.45 kg/m2 Postmenopausal Former Smoker BMI and BSA Data Body Mass Index Body Surface Area  
 30.45 kg/m 2 2.04 m 2 Preferred Pharmacy Pharmacy Name Phone [id number], [address] [phone] Your Updated Medication List  
  
   
This list is accurate as of: 11/9/17  3:46 PM.  Always use your most recent med list.  
  
  
  
  
 aspirin delayed-release 81 mg tablet Take  by mouth daily. atorvastatin 40 mg tablet Commonly known as:  LIPITOR  
TAKE 1 TABLET BY MOUTH EVERY DAY IN THE EVENING  
  
 glimepiride 1 mg tablet Commonly known as:  AMARYL  
TAKE 1 TABLET BY MOUTH EVERY DAY BEFORE BREAKFAST  
  
 glucose blood VI test strips strip Commonly known as:  CONTOUR NEXT STRIPS Use one test strip daily  
  
 metFORMIN 1,000 mg tablet Commonly known as:  GLUCOPHAGE  
TAKE 1 TAB BY MOUTH TWO (2) TIMES DAILY (WITH MEALS). metoprolol succinate 50 mg XL tablet Commonly known as:  TOPROL-XL  
TAKE 1 TABLET BY MOUTH EVERY DAY  
  
 NITROLINGUAL 400 mcg/spray spray Generic drug:  nitroglycerin 1-2 Sprays at onset of attack onto or under tongue no more than 3 sprays in 15 minutes  
  
 ramipril 10 mg capsule Commonly known as:  ALTACE  
TAKE 2 CAPSULES BY MOUTH EVERY MORNING * warfarin 6 mg tablet Commonly known as:  COUMADIN Take 1 Tab by mouth daily. * warfarin 1 mg tablet Commonly known as:  COUMADIN Take 1 Tab by mouth daily. * Notice: This list has 2 medication(s) that are the same as other medications prescribed for you. Read the directions carefully, and ask your doctor or other care provider to review them with you. We Performed the Following AMB POC PT/INR [42364 CPT(R)] Introducing [facility]! Marybeth Palma introduces [employer]esian patient portal. Now you can access parts of your medical record, email your doctor's office, and request medication refills online. 1. In your internet browser, go to [email or web address] 2. Click on the First Time User? Click Here link in the Sign In box.  You will see the New Member Sign Up page. 3. Enter your [employer] original products Access Code exactly as it appears below. You will not need to use this code after youve completed the sign-up process. If you do not sign up before the expiration date, you must request a new code. · [employer] original products Access Code: [email or web address] Expires: 1/23/2018 10:08 AM 
 
4. Enter the last four digits of your Social Security Number (xxxx) and Date of Birth (mm/dd/yyyy) as indicated and click Submit. You will be taken to the next sign-up page. 5. Create a Cr[employer] original products ID. This will be your [employer] original products login ID and cannot be changed, so think of one that is secure and easy to remember. 6. Create a [employer] password. You can change your password at any time. 7. Enter your Password Reset Question and Answer. This can be used at a later time if you forget your password. 8. Enter your e-mail address. You will receive e-mail notification when new information is available in [address]. 9. Click Sign Up. You can now view and download portions of your medical record. 10. Click the Download Summary menu link to download a portable copy of your medical information. If you have questions, please visit the Frequently Asked Questions section of the Crush [employer] original products website. Remember, [employer] products is NOT to be used for urgent needs. For medical emergencies, dial 911. Now available from your iPhone and Android! Please provide this summary of care documentation to your next provider. Your primary care clinician is listed as Leeroy Flynn. If you have any questions after today's visit, please call [phone].

## 2017-11-09 NOTE — PROGRESS NOTES
Chief Complaint   Patient presents with    Other     INR     Sariah Malone is a 57 y.o. female who presents today for Anticoagulation monitoring. Indication: Atrial Fibrillation  INR Goal: 2.0-3.0. Current dose:  Coumadin 7mg daily. Missed Coumadin Doses:  None  Medication Changes:  no  Dietary Changes:  no    Symptoms: taking coumadin appropriately without any bleeding. Latest INRs:  Lab Results   Component Value Date/Time    INR POC 3.1 11/09/2017 03:32 PM    INR POC 1.9 10/04/2017 10:16 AM    INR POC 2.1 08/31/2017 10:06 AM        New Coumadin dose:.the following changes are made - add more greens every day. Next check to be scheduled for  5 weeks.       Has uri resolving   No exam done

## 2017-11-14 RX ORDER — WARFARIN 6 MG/1
TABLET ORAL
Qty: 30 TAB | Refills: 5 | Status: SHIPPED | OUTPATIENT
Start: 2017-11-14

## 2017-12-14 ENCOUNTER — OFFICE VISIT (OUTPATIENT)
Dept: INTERNAL MEDICINE CLINIC | Age: 62
End: 2017-12-14

## 2017-12-14 VITALS
HEIGHT: 67 IN | SYSTOLIC BLOOD PRESSURE: 123 MMHG | OXYGEN SATURATION: 99 % | WEIGHT: 194.6 LBS | HEART RATE: 71 BPM | TEMPERATURE: 98.1 F | BODY MASS INDEX: 30.54 KG/M2 | DIASTOLIC BLOOD PRESSURE: 66 MMHG | RESPIRATION RATE: 18 BRPM

## 2017-12-14 DIAGNOSIS — K06.8 BLEEDING GUMS: ICD-10-CM

## 2017-12-14 DIAGNOSIS — I48.20 CHRONIC ATRIAL FIBRILLATION (HCC): Primary | ICD-10-CM

## 2017-12-14 LAB
INR BLD: 3
PT POC: 35.8 SECONDS
VALID INTERNAL CONTROL?: YES

## 2017-12-14 NOTE — PROGRESS NOTES
Chief Complaint   Patient presents with    Follow-up     1. Have you been to the ER, urgent care clinic since your last visit? Hospitalized since your last visit? No    2. Have you seen or consulted any other health care providers outside of the [address] [place] [address] since your last visit? Include any pap smears or colon screening.  No

## 2017-12-14 NOTE — PROGRESS NOTES
Sara Duenas is a 57 y.o. female who presents today for Anticoagulation monitoring. Indication: Atrial Fibrillation  INR Goal: 2.0-3.0. Current dose:  Coumadin 7mg daily. Missed Coumadin Doses:  None  Medication Changes:  no  Dietary Changes:  no    Symptoms: taking coumadin appropriately with a small amount of bleeding. Gums left upper    Also  Hit right knee  2 weeks ago   has healed  Vitals:    12/14/17 1514   BP: 123/66   Pulse: 71   Resp: 18   Temp: 98.1 °F (36.7 °C)   TempSrc: Oral   SpO2: 99%   Weight: 194 lb 9.6 oz (88.3 kg)   Height: 5' 7\" (1.702 m)     No dental problems no gum bleeding  Latest INRs:  Lab Results   Component Value Date/Time    INR POC 3.1 11/09/2017 03:32 PM    INR POC 1.9 10/04/2017 10:16 AM    INR POC 2.1 08/31/2017 10:06 AM        New Coumadin dose:.the following changes are made - 6.5 mg  daily. Next check to be scheduled for  5 weeks. 1. Chronic atrial fibrillation (HCC)  Lowe dose  - AMB POC PT/INR    2.  Bleeding gums  prob from coumadin appear normal to me see dentist

## 2018-01-11 RX ORDER — METFORMIN HYDROCHLORIDE 1000 MG/1
TABLET ORAL
Qty: 60 TAB | Refills: 5 | Status: SHIPPED | OUTPATIENT
Start: 2018-01-11

## 2018-01-13 DIAGNOSIS — I10 BENIGN HYPERTENSION: ICD-10-CM

## 2018-01-14 RX ORDER — RAMIPRIL 10 MG/1
CAPSULE ORAL
Qty: 180 CAP | Refills: 1 | Status: SHIPPED | OUTPATIENT
Start: 2018-01-14

## 2018-04-02 RX ORDER — ATORVASTATIN CALCIUM 40 MG/1
TABLET, FILM COATED ORAL
Qty: 90 TAB | Refills: 1 | Status: SHIPPED | OUTPATIENT
Start: 2018-04-02 | End: 2018-09-30 | Stop reason: SDUPTHER

## 2018-04-15 RX ORDER — WARFARIN 6 MG/1
TABLET ORAL
Qty: 30 TAB | Refills: 5 | Status: SHIPPED | OUTPATIENT
Start: 2018-04-15

## 2018-04-16 ENCOUNTER — TELEPHONE (OUTPATIENT)
Dept: INTERNAL MEDICINE CLINIC | Age: 63
End: 2018-04-16

## 2018-04-16 NOTE — TELEPHONE ENCOUNTER
Pt is returning a call from [facility].  Pt best contact [phone].

## 2018-04-29 RX ORDER — METOPROLOL SUCCINATE 50 MG/1
TABLET, EXTENDED RELEASE ORAL
Qty: 90 TAB | Refills: 1 | Status: SHIPPED | OUTPATIENT
Start: 2018-04-29 | End: 2018-10-29 | Stop reason: SDUPTHER

## 2018-09-30 RX ORDER — ATORVASTATIN CALCIUM 40 MG/1
TABLET, FILM COATED ORAL
Qty: 90 TAB | Refills: 1 | Status: SHIPPED | OUTPATIENT
Start: 2018-09-30 | End: 2019-03-30 | Stop reason: SDUPTHER

## 2018-10-29 RX ORDER — METOPROLOL SUCCINATE 50 MG/1
TABLET, EXTENDED RELEASE ORAL
Qty: 90 TAB | Refills: 1 | Status: SHIPPED | OUTPATIENT
Start: 2018-10-29 | End: 2019-04-22 | Stop reason: SDUPTHER

## 2019-03-31 RX ORDER — ATORVASTATIN CALCIUM 40 MG/1
TABLET, FILM COATED ORAL
Qty: 90 TAB | Refills: 1 | Status: SHIPPED | OUTPATIENT
Start: 2019-03-31

## 2019-04-22 RX ORDER — METOPROLOL SUCCINATE 50 MG/1
TABLET, EXTENDED RELEASE ORAL
Qty: 90 TAB | Refills: 1 | Status: SHIPPED | OUTPATIENT
Start: 2019-04-22

## 2021-02-24 NOTE — PATIENT INSTRUCTIONS
GlobaTrek Activation    Thank you for requesting access to GlobaTrek. Please follow the instructions below to securely access and download your online medical record. GlobaTrek allows you to send messages to your doctor, view your test results, renew your prescriptions, schedule appointments, and more. How Do I Sign Up? 1. In your internet browser, go to [email or web address]  2. Click on the First Time User? Click Here link in the Sign In box. You will be redirect to the New Member Sign Up page. 3. Enter your GlobaTrek Access Code exactly as it appears below. You will not need to use this code after youve completed the sign-up process. If you do not sign up before the expiration date, you must request a new code. GlobaTrek Access Code: [email or web address]  Expires: 2017  3:31 PM (This is the date your GlobaTrek access code will )    4. Enter the last four digits of your Social Security Number (xxxx) and Date of Birth (mm/dd/yyyy) as indicated and click Submit. You will be taken to the next sign-up page. 5. Create a GlobaTrek ID. This will be your GlobaTrek login ID and cannot be changed, so think of one that is secure and easy to remember. 6. Create a GlobaTrek password. You can change your password at any time. 7. Enter your Password Reset Question and Answer. This can be used at a later time if you forget your password. 8. Enter your e-mail address. You will receive e-mail notification when new information is available in [address] 9. Click Sign Up. You can now view and download portions of your medical record. 10. Click the Download Summary menu link to download a portable copy of your medical information. Additional Information    If you have questions, please visit the Frequently Asked Questions section of the GlobaTrek website at [email or web address] Remember, GlobaTrek is NOT to be used for urgent needs. For medical emergencies, dial 911.            Atrial Fibrillation: Care Instructions  Your Care Instructions    Atrial fibrillation is an irregular and often fast heartbeat. Treating this condition is important for several reasons. It can cause blood clots, which can travel from your heart to your brain and cause a stroke. If you have a fast heartbeat, you may feel lightheaded, dizzy, and weak. An irregular heartbeat can also increase your risk for heart failure. Atrial fibrillation is often the result of another heart condition, such as high blood pressure or coronary artery disease. Making changes to improve your heart condition will help you stay healthy and active. Follow-up care is a key part of your treatment and safety. Be sure to make and go to all appointments, and call your doctor if you are having problems. It's also a good idea to know your test results and keep a list of the medicines you take. How can you care for yourself at home? Medicines  · Take your medicines exactly as prescribed. Call your doctor if you think you are having a problem with your medicine. You will get more details on the specific medicines your doctor prescribes. · If your doctor has given you a blood thinner to prevent a stroke, be sure you get instructions about how to take your medicine safely. Blood thinners can cause serious bleeding problems. · Do not take any vitamins, over-the-counter drugs, or herbal products without talking to your doctor first.  Lifestyle changes  · Do not smoke. Smoking can increase your chance of a stroke and heart attack. If you need help quitting, talk to your doctor about stop-smoking programs and medicines. These can increase your chances of quitting for good. · Eat a heart-healthy diet. · Stay at a healthy weight. Lose weight if you need to. · Limit alcohol to 2 drinks a day for men and 1 drink a day for women. Too much alcohol can cause health problems. · Avoid colds and flu. Get a pneumococcal vaccine shot.  If you have had one before, ask your doctor whether you need another dose. Get a flu shot every year. If you must be around people with colds or flu, wash your hands often. Activity  · If your doctor recommends it, get more exercise. Walking is a good choice. Bit by bit, increase the amount you walk every day. Try for at least 30 minutes on most days of the week. You also may want to swim, bike, or do other activities. Your doctor may suggest that you join a cardiac rehabilitation program so that you can have help increasing your physical activity safely. · Start light exercise if your doctor says it is okay. Even a small amount will help you get stronger, have more energy, and manage stress. Walking is an easy way to get exercise. Start out by walking a little more than you did in the hospital. Gradually increase the amount you walk. · When you exercise, watch for signs that your heart is working too hard. You are pushing too hard if you cannot talk while you are exercising. If you become short of breath or dizzy or have chest pain, sit down and rest immediately. · Check your pulse regularly. Place two fingers on the artery at the palm side of your wrist, in line with your thumb. If your heartbeat seems uneven or fast, talk to your doctor. When should you call for help? Call 911 anytime you think you may need emergency care. For example, call if:  · You have symptoms of a heart attack. These may include:  ¨ Chest pain or pressure, or a strange feeling in the chest.  ¨ Sweating. ¨ Shortness of breath. ¨ Nausea or vomiting. ¨ Pain, pressure, or a strange feeling in the back, neck, jaw, or upper belly or in one or both shoulders or arms. ¨ Lightheadedness or sudden weakness. ¨ A fast or irregular heartbeat. After you call 911, the  may tell you to chew 1 adult-strength or 2 to 4 low-dose aspirin. Wait for an ambulance. Do not try to drive yourself. · You have symptoms of a stroke.  These may include:  ¨ Sudden numbness, tingling, weakness, or loss of movement in your face, arm, or leg, especially on only one side of your body. ¨ Sudden vision changes. ¨ Sudden trouble speaking. ¨ Sudden confusion or trouble understanding simple statements. ¨ Sudden problems with walking or balance. ¨ A sudden, severe headache that is different from past headaches. · You passed out (lost consciousness). Call your doctor now or seek immediate medical care if:  · You have new or increased shortness of breath. · You feel dizzy or lightheaded, or you feel like you may faint. · Your heart rate becomes irregular. · You can feel your heart flutter in your chest or skip heartbeats. Tell your doctor if these symptoms are new or worse. Watch closely for changes in your health, and be sure to contact your doctor if you have any problems. Where can you learn more? Go to http://abril-herlinda.info/. Enter U020 in the search box to learn more about \"Atrial Fibrillation: Care Instructions. \"  Current as of: January 27, 2016  Content Version: 11.2  © 0164-1124 [employer]. Care instructions adapted under license by [employer] (which disclaims liability or warranty for this information). If you have questions about a medical condition or this instruction, always ask your healthcare professional. [place] [address] any warranty or liability for your use of this information. yes